# Patient Record
Sex: MALE | Race: WHITE | NOT HISPANIC OR LATINO | Employment: OTHER | URBAN - METROPOLITAN AREA
[De-identification: names, ages, dates, MRNs, and addresses within clinical notes are randomized per-mention and may not be internally consistent; named-entity substitution may affect disease eponyms.]

---

## 2019-08-09 ENCOUNTER — TRANSCRIBE ORDERS (OUTPATIENT)
Dept: CARDIOLOGY | Facility: HOSPITAL | Age: 54
End: 2019-08-09

## 2019-08-09 DIAGNOSIS — I25.10 CAD IN NATIVE ARTERY: Primary | ICD-10-CM

## 2019-08-09 DIAGNOSIS — R94.30 ABNORMAL CARDIOVASCULAR FUNCTION STUDY: ICD-10-CM

## 2019-08-12 ENCOUNTER — HOSPITAL ENCOUNTER (OUTPATIENT)
Dept: CARDIOLOGY | Facility: HOSPITAL | Age: 54
Discharge: HOME | End: 2019-08-12

## 2019-08-12 ENCOUNTER — HOSPITAL ENCOUNTER (OUTPATIENT)
Dept: CARDIOLOGY | Facility: HOSPITAL | Age: 54
Discharge: HOME | End: 2019-08-12
Admitting: NURSE PRACTITIONER

## 2019-08-12 DIAGNOSIS — I25.10 CAD IN NATIVE ARTERY: ICD-10-CM

## 2019-08-12 DIAGNOSIS — R94.30 ABNORMAL CARDIOVASCULAR FUNCTION STUDY: ICD-10-CM

## 2019-08-12 PROCEDURE — 93016 CV STRESS TEST SUPVJ ONLY: CPT | Performed by: NURSE PRACTITIONER

## 2019-08-12 PROCEDURE — A9500 TC99M SESTAMIBI: HCPCS | Performed by: NURSE PRACTITIONER

## 2019-08-12 PROCEDURE — 25010000002 REGADENOSON 0.4 MG/5ML SOLUTION: Performed by: NURSE PRACTITIONER

## 2019-08-12 PROCEDURE — 78452 HT MUSCLE IMAGE SPECT MULT: CPT

## 2019-08-12 PROCEDURE — 0 TECHNETIUM SESTAMIBI: Performed by: NURSE PRACTITIONER

## 2019-08-12 PROCEDURE — 93017 CV STRESS TEST TRACING ONLY: CPT

## 2019-08-12 RX ADMIN — REGADENOSON 0.4 MG: 0.08 INJECTION, SOLUTION INTRAVENOUS at 09:47

## 2019-08-12 RX ADMIN — TECHNETIUM TC 99M SESTAMIBI 1 DOSE: 1 INJECTION INTRAVENOUS at 09:47

## 2019-08-12 RX ADMIN — TECHNETIUM TC 99M SESTAMIBI 1 DOSE: 1 INJECTION INTRAVENOUS at 08:10

## 2019-08-13 LAB
BH CV STRESS COMMENTS STAGE 1: NORMAL
BH CV STRESS DOSE REGADENOSON STAGE 1: 0.4
BH CV STRESS DURATION MIN STAGE 1: 0
BH CV STRESS DURATION SEC STAGE 1: 10
BH CV STRESS PROTOCOL 1: NORMAL
BH CV STRESS RECOVERY BP: NORMAL MMHG
BH CV STRESS RECOVERY HR: 72 BPM
BH CV STRESS STAGE 1: 1
LV EF NUC BP: 57 %
MAXIMAL PREDICTED HEART RATE: 165 BPM
PERCENT MAX PREDICTED HR: 47.88 %
STRESS BASELINE BP: NORMAL MMHG
STRESS BASELINE HR: 61 BPM
STRESS PERCENT HR: 56 %
STRESS POST PEAK BP: NORMAL MMHG
STRESS POST PEAK HR: 79 BPM
STRESS TARGET HR: 140 BPM

## 2019-08-13 PROCEDURE — 78452 HT MUSCLE IMAGE SPECT MULT: CPT | Performed by: INTERNAL MEDICINE

## 2019-08-13 PROCEDURE — 93018 CV STRESS TEST I&R ONLY: CPT | Performed by: INTERNAL MEDICINE

## 2019-08-22 ENCOUNTER — OFFICE VISIT (OUTPATIENT)
Dept: CARDIOLOGY | Facility: CLINIC | Age: 54
End: 2019-08-22

## 2019-08-22 VITALS
HEART RATE: 75 BPM | OXYGEN SATURATION: 97 % | BODY MASS INDEX: 40.46 KG/M2 | WEIGHT: 282 LBS | DIASTOLIC BLOOD PRESSURE: 79 MMHG | SYSTOLIC BLOOD PRESSURE: 140 MMHG

## 2019-08-22 DIAGNOSIS — E78.2 MIXED HYPERLIPIDEMIA: ICD-10-CM

## 2019-08-22 DIAGNOSIS — Z01.810 PREOPERATIVE CARDIOVASCULAR EXAMINATION: ICD-10-CM

## 2019-08-22 DIAGNOSIS — I10 ESSENTIAL HYPERTENSION: ICD-10-CM

## 2019-08-22 DIAGNOSIS — I25.810 CORONARY ARTERY DISEASE INVOLVING CORONARY BYPASS GRAFT OF NATIVE HEART WITHOUT ANGINA PECTORIS: Primary | ICD-10-CM

## 2019-08-22 DIAGNOSIS — R00.2 PALPITATIONS: ICD-10-CM

## 2019-08-22 PROCEDURE — 99214 OFFICE O/P EST MOD 30 MIN: CPT | Performed by: INTERNAL MEDICINE

## 2019-08-22 PROCEDURE — 93000 ELECTROCARDIOGRAM COMPLETE: CPT | Performed by: INTERNAL MEDICINE

## 2019-08-22 RX ORDER — ISOSORBIDE MONONITRATE 60 MG/1
120 TABLET, EXTENDED RELEASE ORAL 2 TIMES DAILY
Refills: 3 | COMMUNITY
Start: 2019-07-15 | End: 2021-08-06 | Stop reason: DRUGHIGH

## 2019-08-22 RX ORDER — PANTOPRAZOLE SODIUM 40 MG/1
40 TABLET, DELAYED RELEASE ORAL DAILY
COMMUNITY

## 2019-08-22 RX ORDER — CLOPIDOGREL BISULFATE 75 MG/1
TABLET ORAL
COMMUNITY
Start: 2015-09-29 | End: 2019-10-30

## 2019-08-22 RX ORDER — ATORVASTATIN CALCIUM 80 MG/1
TABLET, FILM COATED ORAL
COMMUNITY
End: 2019-10-30

## 2019-08-22 RX ORDER — CARVEDILOL 25 MG/1
25 TABLET ORAL 2 TIMES DAILY WITH MEALS
Refills: 3 | COMMUNITY
Start: 2019-08-14

## 2019-08-22 RX ORDER — INSULIN GLARGINE 100 [IU]/ML
55 INJECTION, SOLUTION SUBCUTANEOUS NIGHTLY
COMMUNITY

## 2019-08-22 RX ORDER — ESCITALOPRAM OXALATE 10 MG/1
10 TABLET ORAL DAILY
Refills: 2 | COMMUNITY
Start: 2019-08-12

## 2019-08-22 NOTE — PROGRESS NOTES
Visit Type:  Consult- former pt.   Referring Provider:  Jeana Weber DNP   Primary Care Provider:  Jeana Weber DNP and [facility] in [facility]     Chief Complaint:  cardiac evaluation for gastric sleeve.    History of Present Illness:    CC:  Preoperative evaluation for pain stimulator.  Chronic ischemic heart disease status post previous CABG 15 years ago, history of recent fluttering.  Diabetes, dyslipidemia.    Dear Jeana    Mr. Tyson is a very pleasant 54 years old gentleman with history of premature coronary artery disease, status post CABG performed about 14 years ago, diabetes, hypertension, dyslipidemia, exogenous obesity and hiatal hernia.     He offers no complaints of chest pain.  He has stable dyspnea on exertion probably attributed to  obesity.  He is a known diabetic as well and takes metformin.  He is contemplating undergoing pain stimulator for his coccyx.    Patient denies any presyncope or syncope.  He has had 2 episodes of short-lived fluttering.  As far as I can tell, presence of atrial fibrillation has not been determined by event monitor although I do not have the event monitor report available to me at the time of this dictation.    He recently underwent Lexiscan Cardiolite study which showed ejection fraction 57% with no evidence of provokable ischemia.  There were fixed inferior apical and lateral defects.  Septal motion was abnormal consistent with previous coronary artery bypass surgery.    His EKG of 8/20/2019 showed normal sinus rhythm with a rate of 76 bpm WI interval of 174 ms QRS duration of 116 ms  ms and QRS axis was 20.  Right bundle branch block incomplete was noted.  Old inferior myocardial infarction cannot be excluded.  There was no difference as compared to previous EKG of 9/29/2015.    Assessment/plan:    1.  Chronic chronic ischemic heart disease status post previous CABG.  Stable no anginal symptoms    2.  History of fluttering nature of any  "cardiac dysrhythmia has not been determined yet but the symptoms are infrequent.  Loop recorder may be considered if he has sporadic symptoms of palpitations.    3.  Blood pressures under good control 140/79 on current therapy.    Patient is stable to proceed with pain stimulator procedure without significant cardiac risk.  We will be glad to see him as the need arise.    Thanks very much for allowing us to participate in the care of your patients       We will see him on a p.r.n. basis and will  send him back to you for further management.  Thanks very much for allowing us to participate in the care of your patients  and I wish him well in his surgery.            Past Medical History:     Reviewed history from 09/29/2015 and no changes required:        Diabetes, Type 2        G E R D        Hypertension        Partial Knee Arthroplasty-left        Hyperlipidemia        Kidney Stone        Myocardial Infarction-\"when in 20's\"        Sleep apnea- C-pap machine         Hiatal hernia        Back pain        morbid obesity    Past Surgical History:     Reviewed history from 09/29/2015 and no changes required:        heart bypass 11/2004-quadruple        penis inplant        pain stimulator 2009        knee replacement 11/2014-left        Cardiac Cath: last at [facility] 2012         Angioplasty/Stent: 3 or 4 stents    Coronary bypass surgery 2015    Active Medications (reviewed today):  LIPITOR 80 MG TABS (ATORVASTATIN CALCIUM) Take one by mouth daily  VITAMIN D3 5000 UNIT CAPS (CHOLECALCIFEROL) Take one by mouth daily  RAPATHA () q 2 weeks  PLAVIX 75 MG TABS (CLOPIDOGREL BISULFATE) Take one by mouth daily  RANEXA 500 MG ORAL XR12H-TAB (RANOLAZINE) take one tab po BID  GNP VITAMIN D SUPER STRENGTH 5000 UNIT ORAL TABS (CHOLECALCIFEROL) take one tab po daily  METFORMIN HCL 1000 MG TABS (METFORMIN HCL) take one tab po BID with breakfast & supper    CARVEDILOL 12.5 MG ORAL TABS (CARVEDILOL) take one tab po BID    PRILOSEC 20 MG " ORAL CPDR (OMEPRAZOLE) take  one tab po BID  LOSARTAN POTASSIUM TABS (LOSARTAN POTASSIUM TABS) 100mg po daily  ISOSORBIDE MONONITRATE ER 60 MG ORAL XR24H-TAB (ISOSORBIDE MONONITRATE) take one tab po daily  ASPIRIN LOW DOSE 81 MG TABS (ASPIRIN) take one tab po daily  LANTUS SOLN (INSULIN GLARGINE SOLN) inject 100 units subq BID  NOVOLOG SOLN (INSULIN ASPART SOLN) sliding scale before evening  meal usually between 40-60 units    Current Allergies (reviewed this update):  * VICONDEN (Critical)  * LORATAB (Critical)    Family History Summary:      Reviewed history and no changes required: 2015  Mother (biol.) - Has Family History of Heart Disease - Mother had CABG -  of aneurysm in brain  - Entered On: 2015  Father (biol.) - Has Family History of Thyroid Problems - Father  from lung cancer and had thyroid cancer  - Entered On: 2015  Father (biol.) - Has Family History of Lung Cancer - Father  from lung cancer and had thyroid cancer  - Entered On: 2015    General Comments - FH:  FH cancer    Social History:     Reviewed history from 2015 and no changes required:        Marital Status:         Children: 2        Occupation: supervisor        Patient is a former smoker.                 Smoking History:        Patient is a former smoker.      Risk Factors:     Smoked Tobacco Use:  Former smoker     Cigarettes: Patient quit smoking about 5 years ago   Drug use:  none  HIV high-risk behavior:  no  Caffeine use:  3 drinks per day  Alcohol use:  yes     Type:  seldom - mixed drink   Exercise:  no  Seatbelt use:  100 %    Family History Risk Factors:     Family History of MI in females < 65 years old:  yes     Family History of MI in males < 55 years old:  no    Colonoscopy History:      Date of Last Colonoscopy:  2013     Results:  Normal         Review of Systems   General: denies fevers, chills, sweats, anorexia, fatigue, malaise, weight loss  Cardiovascular:  coronary  artery disease ,  status post CABG 11 years ago.  Hypertension.  Dyslipidemia.  Diabetes.  Respiratory:   Obstructive sleep apnea  Gastrointestinal:  gastroesophageal reflux disease hiatal hernia  Musculoskeletal:  degenerative joint disease.  Neurologic: denies transient paralysis, weakness, paresthesias, seizures, syncope, tremors, vertigo  Psychiatric: denies depression, anxiety, memory loss, mental disturbance, suicidal ideation, hallucinations, paranoia      Physical Exam    General:      well developed, well nourished, in no acute distress.    Neck:      no masses, thyromegaly, or abnormal cervical nodes.    No JVD. No carotid bruits  Lungs:      clear bilaterally to auscultation.    Heart:      non-displaced PMI, chest non-tender; regular rate and rhythm, S1, S2 without murmurs, rubs, or gallops  Abdomen:       normal bowel sounds; no hepatosplenomegaly no ventral,umbilical hernias or masses noted.    Pulses:      pulses normal in all 4 extremities.    Extremities:       no edema.

## 2019-10-07 ENCOUNTER — OFFICE VISIT (OUTPATIENT)
Dept: NEUROSURGERY | Facility: CLINIC | Age: 54
End: 2019-10-07

## 2019-10-07 VITALS
DIASTOLIC BLOOD PRESSURE: 77 MMHG | OXYGEN SATURATION: 92 % | RESPIRATION RATE: 18 BRPM | HEIGHT: 70 IN | WEIGHT: 282 LBS | BODY MASS INDEX: 40.37 KG/M2 | HEART RATE: 86 BPM | SYSTOLIC BLOOD PRESSURE: 147 MMHG

## 2019-10-07 DIAGNOSIS — M53.3 COCCYDYNIA: ICD-10-CM

## 2019-10-07 DIAGNOSIS — T85.192A FAILURE OF SPINAL CORD STIMULATOR, INITIAL ENCOUNTER (HCC): ICD-10-CM

## 2019-10-07 DIAGNOSIS — M54.50 ACUTE LOW BACK PAIN, UNSPECIFIED BACK PAIN LATERALITY, UNSPECIFIED WHETHER SCIATICA PRESENT: Primary | ICD-10-CM

## 2019-10-07 PROCEDURE — 99203 OFFICE O/P NEW LOW 30 MIN: CPT | Performed by: NEUROLOGICAL SURGERY

## 2019-10-07 NOTE — PROGRESS NOTES
"Subjective   Uday Jay is a 54 y.o. male.     Chief Complaint   Patient presents with   • Back Pain     problems with stimuator      Visit Vitals  /77 (BP Location: Right arm, Patient Position: Sitting, Cuff Size: Large Adult)   Pulse 86   Resp 18   Ht 177.8 cm (70\")   Wt 128 kg (282 lb)   SpO2 92%   BMI 40.46 kg/m²       History of Present Illness: Mr. 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  He fell from a ladder of approximately 8 feet onto his tailbone.  He states since that time he had pain just focused right in the coccygeal area.  He was diagnosed with coxa diarrhea.  He did undergone a series of injections which she states used to help.  Over time he moved to [facility] where he was seen by Dr. Garcia.  The injections stop working overtime.  He was offered neuromodulation at that time.  He had a spinal cord stimulator implanted which was a [employer] system.  He got more than 50% relief of the pain until one day he started noticing that he was not getting the paresthesias anymore.  Subsequently the stimulator was deemed not to be working.  There is no imaging after this.  He was referred to Dr. Tadeo who evaluated him and performed imaging of the lumbar spine demonstrated that the leads had migrated out of the epidural space.  The patient states that he where she did have the stimulator reimplanted because it was providing relief.  He denies any new leg pain or states that he is having any significant back pain he does have chronic back pain for which she does states that helps some but the biggest part of his pain is the pain in his tailbone.  It still predominately aggravated prolonged sitting and standing.  He states the pain is constant but is aggravated with pressure.  He denies any pain on defecation.    The following portions of the patient's history were reviewed and updated as appropriate: allergies, current " medications, past family history, past medical history, past social history, past surgical history and problem list.    Review of Systems      Past Surgical History:   Procedure Laterality Date   • CARDIAC CATHETERIZATION     • CAROTID STENT     • CORONARY ARTERY BYPASS GRAFT     • KNEE ARTHROPLASTY, PARTIAL REPLACEMENT     • PENILE PROSTHESIS IMPLANT     • SPINAL CORD STIMULATOR IMPLANT         Past Medical History:   Diagnosis Date   • Diabetes (CMS/[facility])    • Hyperlipidemia    • Hypertension    • Low back pain        Objective   Physical Exam  Neurologic Exam  Ortho Exam    Alert and oriented by 3  Speech is intact coherent articulate  Cranial nerves III through XII are grossly intact  Motor exam is 5/5 both upper extremities  Lumbar spine mistreats full range of motion  Tender to palpation in the coccygeal region  Data straight leg lift  2+ distal pulses      Upper flexion extension films demonstrate relatively intact disc height and lumbar motion.  There is no evidence of listhesis.  There is migration of the spinal cord stimulator distal leads out of the epidural space in the subcutaneous and muscular region of the lumbar spine.    Assessment and Plan: At this time Mr. Jay suffers from chronic coccydynia.  He did respond to implantation of a spinal cord stimulator system.  Unfortunately this is migrated out of the epidural space.  We discussed today to proceed back with placement of an epidural paddle.  We did discuss the risk of the procedure being bleeding, death, paralysis, infection, CSF leak, failure the procedure need for the procedures.  He indicates he understands wishes to proceed.      Problems Addressed this Visit     None

## 2019-10-14 PROBLEM — M53.3 COCCYDYNIA: Status: ACTIVE | Noted: 2019-10-14

## 2019-10-22 ENCOUNTER — PREP FOR SURGERY (OUTPATIENT)
Dept: OTHER | Facility: HOSPITAL | Age: 54
End: 2019-10-22

## 2019-10-22 DIAGNOSIS — M53.3 COCCYDYNIA: Primary | ICD-10-CM

## 2019-10-22 DIAGNOSIS — T85.192A: ICD-10-CM

## 2019-10-22 RX ORDER — SODIUM CHLORIDE 0.9 % (FLUSH) 0.9 %
3-10 SYRINGE (ML) INJECTION AS NEEDED
Status: CANCELLED | OUTPATIENT
Start: 2019-10-22

## 2019-10-22 RX ORDER — SODIUM CHLORIDE 0.9 % (FLUSH) 0.9 %
3 SYRINGE (ML) INJECTION EVERY 12 HOURS SCHEDULED
Status: CANCELLED | OUTPATIENT
Start: 2019-10-22

## 2019-10-22 RX ORDER — CEFAZOLIN SODIUM IN 0.9 % NACL 3 G/100 ML
3 INTRAVENOUS SOLUTION, PIGGYBACK (ML) INTRAVENOUS ONCE
Status: CANCELLED | OUTPATIENT
Start: 2019-10-22 | End: 2019-10-22

## 2019-10-25 ENCOUNTER — APPOINTMENT (OUTPATIENT)
Dept: PREADMISSION TESTING | Facility: HOSPITAL | Age: 54
End: 2019-10-25

## 2019-10-30 ENCOUNTER — APPOINTMENT (OUTPATIENT)
Dept: PREADMISSION TESTING | Facility: HOSPITAL | Age: 54
End: 2019-10-30

## 2019-10-30 ENCOUNTER — TELEPHONE (OUTPATIENT)
Dept: PREADMISSION TESTING | Facility: HOSPITAL | Age: 54
End: 2019-10-30

## 2019-10-30 ENCOUNTER — HOSPITAL ENCOUNTER (OUTPATIENT)
Dept: GENERAL RADIOLOGY | Facility: HOSPITAL | Age: 54
Discharge: HOME | End: 2019-10-30
Admitting: PHYSICIAN ASSISTANT

## 2019-10-30 VITALS
DIASTOLIC BLOOD PRESSURE: 82 MMHG | HEART RATE: 65 BPM | WEIGHT: 283.6 LBS | OXYGEN SATURATION: 96 % | BODY MASS INDEX: 40.6 KG/M2 | HEIGHT: 70 IN | SYSTOLIC BLOOD PRESSURE: 142 MMHG

## 2019-10-30 DIAGNOSIS — T85.192A: ICD-10-CM

## 2019-10-30 DIAGNOSIS — M53.3 COCCYDYNIA: ICD-10-CM

## 2019-10-30 LAB
ABO GROUP BLD: NORMAL
ALBUMIN SERPL-MCNC: 4.7 G/DL (ref 3.5–5.2)
ALBUMIN/GLOB SERPL: 1.4 G/DL
ALP SERPL-CCNC: 87 U/L (ref 39–117)
ALT SERPL W P-5'-P-CCNC: 28 U/L (ref 1–41)
ANION GAP SERPL CALCULATED.3IONS-SCNC: 13 MMOL/L (ref 5–15)
APTT PPP: 22.7 SECONDS (ref 24–31)
AST SERPL-CCNC: 32 U/L (ref 1–40)
BASOPHILS # BLD AUTO: 0 10*3/MM3 (ref 0–0.2)
BASOPHILS NFR BLD AUTO: 1.3 % (ref 0–1.5)
BILIRUB SERPL-MCNC: 1.2 MG/DL (ref 0.2–1.2)
BILIRUB UR QL STRIP: NEGATIVE
BLD GP AB SCN SERPL QL: NEGATIVE
BUN BLD-MCNC: 15 MG/DL (ref 6–20)
BUN/CREAT SERPL: 17 (ref 7–25)
CALCIUM SPEC-SCNC: 10 MG/DL (ref 8.6–10.5)
CHLORIDE SERPL-SCNC: 98 MMOL/L (ref 98–107)
CLARITY UR: CLEAR
CO2 SERPL-SCNC: 30 MMOL/L (ref 22–29)
COLOR UR: YELLOW
CREAT BLD-MCNC: 0.88 MG/DL (ref 0.76–1.27)
DEPRECATED RDW RBC AUTO: 45.9 FL (ref 37–54)
EOSINOPHIL # BLD AUTO: 0.1 10*3/MM3 (ref 0–0.4)
EOSINOPHIL NFR BLD AUTO: 2.4 % (ref 0.3–6.2)
ERYTHROCYTE [DISTWIDTH] IN BLOOD BY AUTOMATED COUNT: 14.3 % (ref 12.3–15.4)
GFR SERPL CREATININE-BSD FRML MDRD: 90 ML/MIN/1.73
GLOBULIN UR ELPH-MCNC: 3.4 GM/DL
GLUCOSE BLD-MCNC: 123 MG/DL (ref 65–99)
GLUCOSE UR STRIP-MCNC: ABNORMAL MG/DL
HCT VFR BLD AUTO: 41.6 % (ref 37.5–51)
HGB BLD-MCNC: 13.9 G/DL (ref 13–17.7)
HGB UR QL STRIP.AUTO: NEGATIVE
INR PPP: 0.94 (ref 0.9–1.1)
KETONES UR QL STRIP: NEGATIVE
LEUKOCYTE ESTERASE UR QL STRIP.AUTO: NEGATIVE
LYMPHOCYTES # BLD AUTO: 1.1 10*3/MM3 (ref 0.7–3.1)
LYMPHOCYTES NFR BLD AUTO: 28 % (ref 19.6–45.3)
MCH RBC QN AUTO: 30.4 PG (ref 26.6–33)
MCHC RBC AUTO-ENTMCNC: 33.4 G/DL (ref 31.5–35.7)
MCV RBC AUTO: 90.8 FL (ref 79–97)
MONOCYTES # BLD AUTO: 0.4 10*3/MM3 (ref 0.1–0.9)
MONOCYTES NFR BLD AUTO: 9.4 % (ref 5–12)
NEUTROPHILS # BLD AUTO: 2.3 10*3/MM3 (ref 1.7–7)
NEUTROPHILS NFR BLD AUTO: 58.9 % (ref 42.7–76)
NITRITE UR QL STRIP: NEGATIVE
NRBC BLD AUTO-RTO: 0.2 /100 WBC (ref 0–0.2)
PH UR STRIP.AUTO: 7.5 [PH] (ref 5–8)
PLATELET # BLD AUTO: 183 10*3/MM3 (ref 140–450)
PMV BLD AUTO: 8.7 FL (ref 6–12)
POTASSIUM BLD-SCNC: 4.3 MMOL/L (ref 3.5–5.2)
PROT SERPL-MCNC: 8.1 G/DL (ref 6–8.5)
PROT UR QL STRIP: NEGATIVE
PROTHROMBIN TIME: 10 SECONDS (ref 9.6–11.7)
RBC # BLD AUTO: 4.58 10*6/MM3 (ref 4.14–5.8)
RH BLD: POSITIVE
SODIUM BLD-SCNC: 141 MMOL/L (ref 136–145)
SP GR UR STRIP: 1.05 (ref 1–1.03)
T&S EXPIRATION DATE: NORMAL
UROBILINOGEN UR QL STRIP: ABNORMAL
WBC NRBC COR # BLD: 3.9 10*3/MM3 (ref 3.4–10.8)

## 2019-10-30 PROCEDURE — 81003 URINALYSIS AUTO W/O SCOPE: CPT | Performed by: PHYSICIAN ASSISTANT

## 2019-10-30 PROCEDURE — 85025 COMPLETE CBC W/AUTO DIFF WBC: CPT | Performed by: PHYSICIAN ASSISTANT

## 2019-10-30 PROCEDURE — 93005 ELECTROCARDIOGRAM TRACING: CPT

## 2019-10-30 PROCEDURE — 86850 RBC ANTIBODY SCREEN: CPT | Performed by: PHYSICIAN ASSISTANT

## 2019-10-30 PROCEDURE — 80053 COMPREHEN METABOLIC PANEL: CPT | Performed by: PHYSICIAN ASSISTANT

## 2019-10-30 PROCEDURE — 85610 PROTHROMBIN TIME: CPT | Performed by: PHYSICIAN ASSISTANT

## 2019-10-30 PROCEDURE — 86900 BLOOD TYPING SEROLOGIC ABO: CPT

## 2019-10-30 PROCEDURE — 86901 BLOOD TYPING SEROLOGIC RH(D): CPT | Performed by: PHYSICIAN ASSISTANT

## 2019-10-30 PROCEDURE — 85730 THROMBOPLASTIN TIME PARTIAL: CPT | Performed by: PHYSICIAN ASSISTANT

## 2019-10-30 PROCEDURE — 86901 BLOOD TYPING SEROLOGIC RH(D): CPT

## 2019-10-30 PROCEDURE — 71046 X-RAY EXAM CHEST 2 VIEWS: CPT

## 2019-10-30 PROCEDURE — 87081 CULTURE SCREEN ONLY: CPT | Performed by: PHYSICIAN ASSISTANT

## 2019-10-30 PROCEDURE — 36415 COLL VENOUS BLD VENIPUNCTURE: CPT

## 2019-10-30 PROCEDURE — 86900 BLOOD TYPING SEROLOGIC ABO: CPT | Performed by: PHYSICIAN ASSISTANT

## 2019-10-30 RX ORDER — ATORVASTATIN CALCIUM 80 MG/1
80 TABLET, FILM COATED ORAL DAILY
COMMUNITY

## 2019-10-30 RX ORDER — ASPIRIN 325 MG
325 TABLET, DELAYED RELEASE (ENTERIC COATED) ORAL DAILY
COMMUNITY

## 2019-10-30 RX ORDER — CLOPIDOGREL BISULFATE 75 MG/1
75 TABLET ORAL DAILY
COMMUNITY

## 2019-10-31 LAB — MRSA SPEC QL CULT: NORMAL

## 2019-10-31 PROCEDURE — 93010 ELECTROCARDIOGRAM REPORT: CPT | Performed by: INTERNAL MEDICINE

## 2019-11-04 NOTE — PAT
Spoke with Anat at Dr Tyler, she said that Dr Tyler should clear him however she cant get the clearance to us today, she will scan it into epic in the morning. Clearance was first requested on 10/31/19.

## 2019-11-05 ENCOUNTER — HOSPITAL ENCOUNTER (OUTPATIENT)
Facility: HOSPITAL | Age: 54
Setting detail: HOSPITAL OUTPATIENT SURGERY
Discharge: HOME | End: 2019-11-05
Attending: NEUROLOGICAL SURGERY | Admitting: NEUROLOGICAL SURGERY

## 2019-11-05 ENCOUNTER — ANESTHESIA EVENT (OUTPATIENT)
Dept: PERIOP | Facility: HOSPITAL | Age: 54
End: 2019-11-05

## 2019-11-05 ENCOUNTER — APPOINTMENT (OUTPATIENT)
Dept: GENERAL RADIOLOGY | Facility: HOSPITAL | Age: 54
End: 2019-11-05

## 2019-11-05 ENCOUNTER — ANESTHESIA (OUTPATIENT)
Dept: PERIOP | Facility: HOSPITAL | Age: 54
End: 2019-11-05

## 2019-11-05 VITALS
HEIGHT: 71 IN | SYSTOLIC BLOOD PRESSURE: 130 MMHG | BODY MASS INDEX: 39.23 KG/M2 | TEMPERATURE: 97.5 F | RESPIRATION RATE: 18 BRPM | WEIGHT: 280.2 LBS | HEART RATE: 66 BPM | DIASTOLIC BLOOD PRESSURE: 75 MMHG | OXYGEN SATURATION: 95 %

## 2019-11-05 DIAGNOSIS — T85.192A: ICD-10-CM

## 2019-11-05 DIAGNOSIS — M53.3 COCCYDYNIA: ICD-10-CM

## 2019-11-05 LAB
GLUCOSE BLDC GLUCOMTR-MCNC: 119 MG/DL (ref 70–105)
GLUCOSE BLDC GLUCOMTR-MCNC: 125 MG/DL (ref 70–105)

## 2019-11-05 PROCEDURE — 25010000002 DEXAMETHASONE PER 1 MG: Performed by: NURSE ANESTHETIST, CERTIFIED REGISTERED

## 2019-11-05 PROCEDURE — 25010000002 KETOROLAC TROMETHAMINE PER 15 MG: Performed by: ANESTHESIOLOGY

## 2019-11-05 PROCEDURE — 76000 FLUOROSCOPY <1 HR PHYS/QHP: CPT

## 2019-11-05 PROCEDURE — 25010000002 FENTANYL CITRATE (PF) 100 MCG/2ML SOLUTION: Performed by: NURSE ANESTHETIST, CERTIFIED REGISTERED

## 2019-11-05 PROCEDURE — 63661 REMOVE SPINE ELTRD PERQ ARAY: CPT | Performed by: NEUROLOGICAL SURGERY

## 2019-11-05 PROCEDURE — 63688 REV/RMV IMP SP NPG/R DTCH CN: CPT | Performed by: NEUROLOGICAL SURGERY

## 2019-11-05 PROCEDURE — C1778 LEAD, NEUROSTIMULATOR: HCPCS | Performed by: NEUROLOGICAL SURGERY

## 2019-11-05 PROCEDURE — 25010000002 PROPOFOL 10 MG/ML EMULSION: Performed by: NURSE ANESTHETIST, CERTIFIED REGISTERED

## 2019-11-05 PROCEDURE — S0260 H&P FOR SURGERY: HCPCS | Performed by: NEUROLOGICAL SURGERY

## 2019-11-05 PROCEDURE — 82962 GLUCOSE BLOOD TEST: CPT

## 2019-11-05 PROCEDURE — 72070 X-RAY EXAM THORAC SPINE 2VWS: CPT

## 2019-11-05 PROCEDURE — 63655 IMPLANT NEUROELECTRODES: CPT | Performed by: NEUROLOGICAL SURGERY

## 2019-11-05 DEVICE — LD STIM PENTA PADL KT 16CH 3MM 60CM: Type: IMPLANTABLE DEVICE | Site: BACK | Status: FUNCTIONAL

## 2019-11-05 DEVICE — ANCHR LD SCS SWIFTLOCK 1192: Type: IMPLANTABLE DEVICE | Site: BACK | Status: FUNCTIONAL

## 2019-11-05 RX ORDER — FENTANYL CITRATE 50 UG/ML
50 INJECTION, SOLUTION INTRAMUSCULAR; INTRAVENOUS
Status: DISCONTINUED | OUTPATIENT
Start: 2019-11-05 | End: 2019-11-05 | Stop reason: HOSPADM

## 2019-11-05 RX ORDER — DEXAMETHASONE SODIUM PHOSPHATE 4 MG/ML
INJECTION, SOLUTION INTRA-ARTICULAR; INTRALESIONAL; INTRAMUSCULAR; INTRAVENOUS; SOFT TISSUE AS NEEDED
Status: DISCONTINUED | OUTPATIENT
Start: 2019-11-05 | End: 2019-11-05 | Stop reason: SURG

## 2019-11-05 RX ORDER — TRAMADOL HYDROCHLORIDE 50 MG/1
50 TABLET ORAL EVERY 6 HOURS PRN
Qty: 90 TABLET | Refills: 0 | Status: SHIPPED | OUTPATIENT
Start: 2019-11-05 | End: 2020-10-19

## 2019-11-05 RX ORDER — PROPOFOL 10 MG/ML
VIAL (ML) INTRAVENOUS AS NEEDED
Status: DISCONTINUED | OUTPATIENT
Start: 2019-11-05 | End: 2019-11-05 | Stop reason: SURG

## 2019-11-05 RX ORDER — LIDOCAINE HYDROCHLORIDE 20 MG/ML
INJECTION, SOLUTION EPIDURAL; INFILTRATION; INTRACAUDAL; PERINEURAL AS NEEDED
Status: DISCONTINUED | OUTPATIENT
Start: 2019-11-05 | End: 2019-11-05 | Stop reason: SURG

## 2019-11-05 RX ORDER — HYDROMORPHONE HCL 110MG/55ML
0.5 PATIENT CONTROLLED ANALGESIA SYRINGE INTRAVENOUS
Status: DISCONTINUED | OUTPATIENT
Start: 2019-11-05 | End: 2019-11-05 | Stop reason: HOSPADM

## 2019-11-05 RX ORDER — SODIUM CHLORIDE 0.9 % (FLUSH) 0.9 %
3 SYRINGE (ML) INJECTION EVERY 12 HOURS SCHEDULED
Status: DISCONTINUED | OUTPATIENT
Start: 2019-11-05 | End: 2019-11-05 | Stop reason: HOSPADM

## 2019-11-05 RX ORDER — FENTANYL CITRATE 50 UG/ML
INJECTION, SOLUTION INTRAMUSCULAR; INTRAVENOUS AS NEEDED
Status: DISCONTINUED | OUTPATIENT
Start: 2019-11-05 | End: 2019-11-05 | Stop reason: SURG

## 2019-11-05 RX ORDER — CEFAZOLIN SODIUM IN 0.9 % NACL 3 G/100 ML
3 INTRAVENOUS SOLUTION, PIGGYBACK (ML) INTRAVENOUS ONCE
Status: DISCONTINUED | OUTPATIENT
Start: 2019-11-05 | End: 2019-11-05 | Stop reason: HOSPADM

## 2019-11-05 RX ORDER — ONDANSETRON 2 MG/ML
4 INJECTION INTRAMUSCULAR; INTRAVENOUS ONCE AS NEEDED
Status: DISCONTINUED | OUTPATIENT
Start: 2019-11-05 | End: 2019-11-05 | Stop reason: HOSPADM

## 2019-11-05 RX ORDER — BACITRACIN 50000 [IU]/1
INJECTION, POWDER, FOR SOLUTION INTRAMUSCULAR AS NEEDED
Status: DISCONTINUED | OUTPATIENT
Start: 2019-11-05 | End: 2019-11-05 | Stop reason: HOSPADM

## 2019-11-05 RX ORDER — HYDROCODONE BITARTRATE AND ACETAMINOPHEN 7.5; 325 MG/1; MG/1
1 TABLET ORAL ONCE AS NEEDED
Status: COMPLETED | OUTPATIENT
Start: 2019-11-05 | End: 2019-11-05

## 2019-11-05 RX ORDER — ROCURONIUM BROMIDE 10 MG/ML
INJECTION, SOLUTION INTRAVENOUS AS NEEDED
Status: DISCONTINUED | OUTPATIENT
Start: 2019-11-05 | End: 2019-11-05 | Stop reason: SURG

## 2019-11-05 RX ORDER — SODIUM CHLORIDE 0.9 % (FLUSH) 0.9 %
3-10 SYRINGE (ML) INJECTION AS NEEDED
Status: DISCONTINUED | OUTPATIENT
Start: 2019-11-05 | End: 2019-11-05 | Stop reason: HOSPADM

## 2019-11-05 RX ORDER — LABETALOL HYDROCHLORIDE 5 MG/ML
5 INJECTION, SOLUTION INTRAVENOUS
Status: DISCONTINUED | OUTPATIENT
Start: 2019-11-05 | End: 2019-11-05 | Stop reason: HOSPADM

## 2019-11-05 RX ORDER — LIDOCAINE HYDROCHLORIDE AND EPINEPHRINE 10; 10 MG/ML; UG/ML
INJECTION, SOLUTION INFILTRATION; PERINEURAL AS NEEDED
Status: DISCONTINUED | OUTPATIENT
Start: 2019-11-05 | End: 2019-11-05 | Stop reason: HOSPADM

## 2019-11-05 RX ORDER — NEOSTIGMINE METHYLSULFATE 5 MG/5 ML
SYRINGE (ML) INTRAVENOUS AS NEEDED
Status: DISCONTINUED | OUTPATIENT
Start: 2019-11-05 | End: 2019-11-05 | Stop reason: SURG

## 2019-11-05 RX ORDER — HYDRALAZINE HYDROCHLORIDE 20 MG/ML
5 INJECTION INTRAMUSCULAR; INTRAVENOUS
Status: DISCONTINUED | OUTPATIENT
Start: 2019-11-05 | End: 2019-11-05 | Stop reason: HOSPADM

## 2019-11-05 RX ORDER — KETOROLAC TROMETHAMINE 30 MG/ML
INJECTION, SOLUTION INTRAMUSCULAR; INTRAVENOUS AS NEEDED
Status: DISCONTINUED | OUTPATIENT
Start: 2019-11-05 | End: 2019-11-05 | Stop reason: SURG

## 2019-11-05 RX ORDER — SODIUM CHLORIDE 9 MG/ML
9 INJECTION, SOLUTION INTRAVENOUS CONTINUOUS PRN
Status: DISCONTINUED | OUTPATIENT
Start: 2019-11-05 | End: 2019-11-05 | Stop reason: HOSPADM

## 2019-11-05 RX ORDER — GLYCOPYRROLATE 1 MG/5 ML
SYRINGE (ML) INTRAVENOUS AS NEEDED
Status: DISCONTINUED | OUTPATIENT
Start: 2019-11-05 | End: 2019-11-05 | Stop reason: SURG

## 2019-11-05 RX ADMIN — PROPOFOL 200 MG: 10 INJECTION, EMULSION INTRAVENOUS at 14:46

## 2019-11-05 RX ADMIN — Medication 0.2 MG: at 15:17

## 2019-11-05 RX ADMIN — FENTANYL CITRATE 50 MCG: 50 INJECTION, SOLUTION INTRAMUSCULAR; INTRAVENOUS at 15:29

## 2019-11-05 RX ADMIN — SODIUM CHLORIDE 9 ML/HR: 0.9 INJECTION, SOLUTION INTRAVENOUS at 12:15

## 2019-11-05 RX ADMIN — SODIUM CHLORIDE: 0.9 INJECTION, SOLUTION INTRAVENOUS at 14:35

## 2019-11-05 RX ADMIN — CEFAZOLIN SODIUM 3 G: 1 INJECTION, POWDER, FOR SOLUTION INTRAMUSCULAR; INTRAVENOUS at 14:51

## 2019-11-05 RX ADMIN — DEXAMETHASONE SODIUM PHOSPHATE 4 MG: 4 INJECTION, SOLUTION INTRAMUSCULAR; INTRAVENOUS at 14:51

## 2019-11-05 RX ADMIN — LIDOCAINE HYDROCHLORIDE 100 MG: 20 INJECTION, SOLUTION EPIDURAL; INFILTRATION; INTRACAUDAL; PERINEURAL at 14:36

## 2019-11-05 RX ADMIN — KETOROLAC TROMETHAMINE 30 MG: 30 INJECTION, SOLUTION INTRAMUSCULAR at 16:08

## 2019-11-05 RX ADMIN — Medication 0.6 MG: at 16:05

## 2019-11-05 RX ADMIN — ROCURONIUM BROMIDE 30 MG: 10 INJECTION, SOLUTION INTRAVENOUS at 14:47

## 2019-11-05 RX ADMIN — FENTANYL CITRATE 50 MCG: 50 INJECTION, SOLUTION INTRAMUSCULAR; INTRAVENOUS at 15:13

## 2019-11-05 RX ADMIN — Medication 4 MG: at 16:05

## 2019-11-05 RX ADMIN — HYDROCODONE BITARTRATE AND ACETAMINOPHEN 1 TABLET: 7.5; 325 TABLET ORAL at 17:32

## 2019-11-05 RX ADMIN — FENTANYL CITRATE 100 MCG: 50 INJECTION, SOLUTION INTRAMUSCULAR; INTRAVENOUS at 14:36

## 2019-11-05 NOTE — ANESTHESIA POSTPROCEDURE EVALUATION
Patient: Uday Jay    Procedure Summary     Date:  11/05/19 Room / Location:  [facility] OR  / [facility] MAIN OR    Anesthesia Start:  1434 Anesthesia Stop:  1633    Procedure:  T10 laminectomy and placement of spinal cord stimulator (N/A Back) Diagnosis:       Coccydynia      Failed spinal cord stimulator (CMS/HCC)      (Coccydynia [M53.3])      (Failed spinal cord stimulator (CMS/HCC) [T85.192A])    Surgeon:  Gianluca Miles MD Provider:  Mikey Bird MD    Anesthesia Type:  general ASA Status:  3          Anesthesia Type: general  Last vitals  BP   133/85 (11/05/19 1744)   Temp   97.9 °F (36.6 °C) (11/05/19 1744)   Pulse   60 (11/05/19 1744)   Resp   18 (11/05/19 1744)     SpO2   92 % (11/05/19 1744)     Post Anesthesia Care and Evaluation    Patient location during evaluation: PACU  Patient participation: complete - patient participated  Level of consciousness: awake  Pain scale: See nurse's notes for pain score.  Pain management: adequate  Airway patency: patent  Anesthetic complications: No anesthetic complications  PONV Status: none  Cardiovascular status: acceptable  Respiratory status: acceptable  Hydration status: acceptable    Comments: Patient seen and examined postoperatively; vital signs stable; SpO2 greater than or equal to 90%; cardiopulmonary status stable; nausea/vomiting adequately controlled; pain adequately controlled; no apparent anesthesia complications; patient discharged from anesthesia care when discharge criteria were met

## 2019-11-05 NOTE — ANESTHESIA PREPROCEDURE EVALUATION
Anesthesia Evaluation     Patient summary reviewed   NPO Solid Status: > 8 hours  NPO Liquid Status: > 8 hours           Airway   Mallampati: I  TM distance: >3 FB  Neck ROM: full  No difficulty expected  Dental - normal exam     Pulmonary - normal exam   (+) sleep apnea,   Cardiovascular - normal exam  Exercise tolerance: good (4-7 METS)    ECG reviewed    (+) hypertension, CAD, CABG, hyperlipidemia,       Neuro/Psych  GI/Hepatic/Renal/Endo    (+)  GERD,  diabetes mellitus,     Musculoskeletal     Abdominal  - normal exam    Bowel sounds: normal.   Substance History      OB/GYN          Other                        Anesthesia Plan    ASA 3     general     intravenous induction     Anesthetic plan, all risks, benefits, and alternatives have been provided, discussed and informed consent has been obtained with: patient.  Use of blood products discussed with patient  Consented to blood products.

## 2019-11-05 NOTE — ANESTHESIA PROCEDURE NOTES
Airway  Urgency: elective    Date/Time: 11/5/2019 2:47 PM  Airway not difficult    General Information and Staff    Patient location during procedure: OR    Indications and Patient Condition  Indications for airway management: airway protection    Preoxygenated: yes  MILS maintained throughout  Mask difficulty assessment: 1 - vent by mask    Final Airway Details  Final airway type: endotracheal airway      Successful airway: ETT  Cuffed: yes   Successful intubation technique: direct laryngoscopy  Facilitating devices/methods: intubating stylet  Endotracheal tube insertion site: oral  Blade: Medrano  Blade size: 2  ETT size (mm): 7.5  Cormack-Lehane Classification: grade IIb - view of arytenoids or posterior of glottis only  Placement verified by: chest auscultation and capnometry   Cuff volume (mL): 10  Measured from: teeth  ETT/EBT  to teeth (cm): 23  Number of attempts at approach: 1  Assessment: lips, teeth, and gum same as pre-op and atraumatic intubation

## 2019-11-07 NOTE — H&P
" Subjective      Uday Jay is a 54 y.o. male.           Chief Complaint   Patient presents with   • Back Pain       problems with stimuator       Visit Vitals  /77 (BP Location: Right arm, Patient Position: Sitting, Cuff Size: Large Adult)   Pulse 86   Resp 18   Ht 177.8 cm (70\")   Wt 128 kg (282 lb)   SpO2 92%   BMI 40.46 kg/m²         History of Present Illness: Mr. 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  He fell from a ladder of approximately 8 feet onto his tailbone.  He states since that time he had pain just focused right in the coccygeal area.  He was diagnosed with coxa diarrhea.  He did undergone a series of injections which she states used to help.  Over time he moved to [facility] where he was seen by Dr. Garcia.  The injections stop working overtime.  He was offered neuromodulation at that time.  He had a spinal cord stimulator implanted which was a [employer] system.  He got more than 50% relief of the pain until one day he started noticing that he was not getting the paresthesias anymore.  Subsequently the stimulator was deemed not to be working.  There is no imaging after this.  He was referred to Dr. Tadeo who evaluated him and performed imaging of the lumbar spine demonstrated that the leads had migrated out of the epidural space.  The patient states that he where she did have the stimulator reimplanted because it was providing relief.  He denies any new leg pain or states that he is having any significant back pain he does have chronic back pain for which she does states that helps some but the biggest part of his pain is the pain in his tailbone.  It still predominately aggravated prolonged sitting and standing.  He states the pain is constant but is aggravated with pressure.  He denies any pain on defecation.     The following portions of the patient's history were reviewed and updated as appropriate: " allergies, current medications, past family history, past medical history, past social history, past surgical history and problem list.     Review of Systems        Surgical History         Past Surgical History:   Procedure Laterality Date   • CARDIAC CATHETERIZATION       • CAROTID STENT       • CORONARY ARTERY BYPASS GRAFT       • KNEE ARTHROPLASTY, PARTIAL REPLACEMENT       • PENILE PROSTHESIS IMPLANT       • SPINAL CORD STIMULATOR IMPLANT                Medical History        Past Medical History:   Diagnosis Date   • Diabetes (CMS/[facility])     • Hyperlipidemia     • Hypertension     • Low back pain                 Objective      Physical Exam  Neurologic Exam  Ortho Exam     Alert and oriented by 3  Speech is intact coherent articulate  Cranial nerves III through XII are grossly intact  Motor exam is 5/5 both upper extremities  Lumbar spine mistreats full range of motion  Tender to palpation in the coccygeal region  Data straight leg lift  2+ distal pulses        Upper flexion extension films demonstrate relatively intact disc height and lumbar motion.  There is no evidence of listhesis.  There is migration of the spinal cord stimulator distal leads out of the epidural space in the subcutaneous and muscular region of the lumbar spine.     Assessment and Plan: At this time Mr. Jay suffers from chronic coccydynia.  He did respond to implantation of a spinal cord stimulator system.  Unfortunately this is migrated out of the epidural space.  We discussed today to proceed back with placement of an epidural paddle.  We did discuss the risk of the procedure being bleeding, death, paralysis, infection, CSF leak, failure the procedure need for the procedures.  He indicates he understands wishes to proceed.

## 2019-11-11 NOTE — OP NOTE
SPINAL CORD STIMULATOR INSERTION PHASE 2  Procedure Report    Patient Name:  Uday Jay  YOB: 1965    Date of Surgery:  11/5/2019     Indications: Mr. Jay is a 54-year-old gentleman who suffers from neuropathic pain and associated coccydynia.  He underwent placement of a spinal cord stimulator system for which she obtained good results.  The system migrated out of the epidural space.  He is here today for the removal of the migrated leads and implantation of an epidural paddle at T10.    Pre-op Diagnosis:   Coccydynia [M53.3]  Failed spinal cord stimulator (CMS/HCC) [T85.192A]       Post-Op Diagnosis Codes:     * Coccydynia [M53.3]     * Failed spinal cord stimulator (CMS/HCC) [T85.192A]    Procedure/CPT® Codes:  1.  Removal of failed spinal cord stimulator system  2.  T10 laminectomy and placement of spinal cord stimulator epidural paddle and connection to internal pulse generator (Abbot Penta paddle)  3.  Epidural adhesional lysis of epidural scar   4.  Intraoperative electrodiagnostic interrogation of spinal cord stim later system    Procedure(s):  T10 laminectomy and placement of spinal cord stimulator    Staff:  Surgeon(s):  Gianluca Miles MD         Anesthesia: General    Estimated Blood Loss: 40 ml    Implants:    Implant Name Type Inv. Item Serial No.  Lot No. LRB No. Used   LD STIM PENTA PADL KT 16CH 3MM 60CM - [id number] - [id number] Implant LD STIM PENTA PADL KT 16CH 3MM 60CM [id number] [employer]V [employer] SYS ANS . N/A 1   ANCHR LD SANDY LK 1192 - [id number] Implant ANCHR LD SANDY LK 1192  ADV NEUROMODULATIONS SYS ANS [id number] N/A 1       Specimen:          None        Findings: Dictated    Complications: None    Description of Procedure: The patient was taken to the operating theater where they were placed under general endotracheal anesthesia in the supine position.  The patient was then placed prone on the radiolucent operating table.  The arms were rested  comfortably at 90 degrees and the head was rested on a surgical pillow.  All pressure points were adequately padded and inspected.  The thoracolumbar and left lateral flank region was then prepped and draped in a sterile fashion.  A timeout was conducted in the room to confirm the appropriate patient, site of surgery, and procedure.  At this time a 10 blade scalpel was then used to make an incision over the previous incision site at the battery pocket.  The battery pocket was then opened using Bovie cautery.  The battery was then externalized.  The battery was then cut from the leads.  The battery was placed back on the back table to be used later in the surgery.  At this time a midline incision was then made at the lower lumbar area with anchor sites were.  Hemostasis was achieved Bovie cauterization.  Dissection was carried out with Bovie cautery to remove the Silastic anchors and the associated scar tissue.  The leads were then pulled out in their entirety.  At this time intraoperative fluoroscopy was brought into the AP plane.  Inspection did not demonstrate any similar system.  At this time the T10 level was identified.  A 10 blade scalpel was then used to make a midline incision.  Hemostasis was achieved Bovie cauterization.  Dissection was then carried down to the T10 lamina and subperiosteal dissection was then carried out to the medial aspect of the facets.  Self retaining retractors were then placed.  A Leksell rondure was then used to remove the spinous process and interspinous ligament.  High-speed drill was then used to drill away the inferior aspect of the lamina bilaterally.  Bleeding was controlled FloSeal.  The ligamentum flavum was then undermined with an up-biting cervical curette and removed with a 2 m Kerrison punch.  At this time a Penta paddle was then gently placed in the epidural space after adhesion lysis of the epidural scar was performed.  The paddle was then placed into position and AP  fluoroscopy demonstrated the paddle to be in good position.  The wound was copiously irrigated bacitracin irrigation.  A radiopaque anchor was then placed and secured in the subfascial space.  The fascia was then reapproximated with 0 Vicryl and a loose loop of the lead was then secured using 0 silk sutures in the epifascial space.  The leads were then tunneled to the battery pocket.  The distal ends of the leads were then connected to the battery.  The battery was placed back into the pocket after copious irrigation.  Intraoperative electrodiagnostic interrogation demonstrated all electrodiagnostics to be in appropriate limits.  At this time the wound scopes irrigated again bacitracin irrigation.  The subcutaneous tissue was then reapproximated with 0 Vicryl.  The skin incisions were then closed with 4-0 Monocryl.  Dressings were applied to the wound.  The patient was then placed back to the supine position and awakened from anesthesia.  The patient was then extubated taken to recovery without incident.      Gianluca Miles MD     Date: 11/11/2019  Time: 8:57 AM

## 2019-11-18 ENCOUNTER — OFFICE VISIT (OUTPATIENT)
Dept: NEUROSURGERY | Facility: CLINIC | Age: 54
End: 2019-11-18

## 2019-11-18 VITALS
HEIGHT: 70 IN | HEART RATE: 83 BPM | SYSTOLIC BLOOD PRESSURE: 114 MMHG | DIASTOLIC BLOOD PRESSURE: 73 MMHG | WEIGHT: 283.8 LBS | BODY MASS INDEX: 40.63 KG/M2

## 2019-11-18 DIAGNOSIS — M53.3 COCCYDYNIA: Primary | ICD-10-CM

## 2019-11-18 PROBLEM — E11.9 TYPE 2 DIABETES MELLITUS WITHOUT COMPLICATIONS (HCC): Status: ACTIVE | Noted: 2019-11-18

## 2019-11-18 PROBLEM — R07.89 PAIN OF STERNUM: Status: ACTIVE | Noted: 2019-11-18

## 2019-11-18 PROBLEM — Z96.659 PRESENCE OF ARTIFICIAL KNEE JOINT: Status: ACTIVE | Noted: 2019-11-18

## 2019-11-18 PROBLEM — I10 HYPERTENSION: Status: ACTIVE | Noted: 2019-11-18

## 2019-11-18 PROBLEM — I21.9 MYOCARDIAL INFARCTION: Status: ACTIVE | Noted: 2019-11-18

## 2019-11-18 PROBLEM — H52.4 PRESBYOPIA: Status: ACTIVE | Noted: 2019-11-18

## 2019-11-18 PROBLEM — H25.013 CORTICAL AGE-RELATED CATARACT, BILATERAL: Status: ACTIVE | Noted: 2019-11-18

## 2019-11-18 PROCEDURE — 99024 POSTOP FOLLOW-UP VISIT: CPT | Performed by: NEUROLOGICAL SURGERY

## 2019-11-18 NOTE — PROGRESS NOTES
"Subjective   Uday Jay is a 54 y.o. male.     Chief Complaint   Patient presents with   • Post-op     Placement SCS     Visit Vitals  /73   Pulse 83   Ht 177.8 cm (70\")   Wt 129 kg (283 lb 12.8 oz)   BMI 40.72 kg/m²       History of Present Illness:  Mr. Uday Jay is a 54-year-old male presents to the office today as a postop follow-up patient.  On 11/5/19 the patient underwent a placement of a spinal cord stimulator for the treatment of coccydynia.  He states he has had really good control of his pain.  His incisional pain is well controlled.    The following portions of the patient's history were reviewed and updated as appropriate: allergies, current medications, past family history, past medical history, past social history, past surgical history and problem list.    Review of Systems      Past Surgical History:   Procedure Laterality Date   • CARDIAC CATHETERIZATION     • CARDIAC SURGERY  2004   • CAROTID STENT     • CORONARY ARTERY BYPASS GRAFT     • KNEE ARTHROPLASTY, PARTIAL REPLACEMENT     • PENILE PROSTHESIS IMPLANT     • SPINAL CORD STIMULATOR IMPLANT     • SPINAL CORD STIMULATOR IMPLANT N/A 11/5/2019    Procedure: T10 laminectomy and placement of spinal cord stimulator;  Surgeon: Gianluca Miles MD;  Location: [facility];  Service: Neurosurgery       Past Medical History:   Diagnosis Date   • Coronary artery disease    • Diabetes (CMS/HCC)    • GERD (gastroesophageal reflux disease)    • Hyperlipidemia    • Hypertension    • Low back pain    • Sleep apnea        Objective   Physical Exam  Neurologic Exam  Ortho Exam    Incision is clean dry and intact and healing well  Focal neurologic deficits    Focused programming here today for mapping and programming with a total hour of intervention.  During the programming session changes were made in both amplitude, frequency, and pulse width.  The patient was given at least 3 amplitude settings and to frequency settings and the " pulse width was maintained at one setting.  Please refer to the media section for the complete detail programming report.      Assessment and Plan: Mr. Jay is doing quite well.  His staples were removed here today.  At this time he underwent successful programming and mapping here today.  He can follow back up with us on a as needed basis and further programming can be performed at his pain management office.      Problems Addressed this Visit     None

## 2020-05-15 ENCOUNTER — TELEPHONE (OUTPATIENT)
Dept: NEUROSURGERY | Facility: CLINIC | Age: 55
End: 2020-05-15

## 2020-05-15 NOTE — TELEPHONE ENCOUNTER
PATIENT CALLED WITH CONCERN ABOUT HIS BILL.  HE BELIEVES HE IS BEING CHARGED FOR SOMETHING HE DID NOT RECEIVE.  HE NEEDS A BETTER BREAK DOWN OF HIS PROCEDURE DONE 11/5/2019.  HE HAS CALLED BILLING AND WAS TOLD TO CALL THE PRACTICE FOR BETTER BREAKDOWN OF CHARGES.  THE BILLING CODE IN QUESTION IS . PLEASE CALL PATIENT AND ASSIST WITH HIS CONCERN.    [phone]

## 2020-05-20 ENCOUNTER — TELEPHONE (OUTPATIENT)
Dept: NEUROSURGERY | Facility: CLINIC | Age: 55
End: 2020-05-20

## 2020-05-22 NOTE — TELEPHONE ENCOUNTER
Spoke with Misti Azul with the Business Office regarding providing the patient with his requested itemized bill.   She said she would send it in the mail to him.

## 2020-06-22 NOTE — TELEPHONE ENCOUNTER
Pt says that he has a bill for $2,000 for a lead. He says that Peter casillas a rep for  the company that does the leads  said that the company only charges $4,000 for that lead.       Pt wants to know who coded this bill the office or the billing office. He wants to know if the office can get the code changed.   Uday  [phone]

## 2020-07-08 NOTE — TELEPHONE ENCOUNTER
This is out of my knowing, I have no idea the charge side of the surgeries, I only code them from what is given to me at the time surgery is discussed in office if anything is added or changed then it falls on the hospital,it was coded correctly on my end and Peter really should have not told the patient the above information, this is something that will need to be disputed between him, insurance carrier and hospital. I will inform patient of this

## 2020-07-08 NOTE — TELEPHONE ENCOUNTER
PT WANTS TO TALK TO JERONIMO ABOUT HIS BILL    PT IS CALLING ABOUT A LEAD THAT DR MINER PUT IN HIM. PT SAYS THAT HE SPOKE WITH ANNAMARIE THE REP FROM [employer] AND ANNAMARIE  INFORMED THE PT THAT HE IS BEING OVER CHARGED FOR THIS LEAD.      PT SAYS HE HAS SPOKEN WITH THE BILLING OFFICE AND THEY TOLD HIM IS WAS CODED CORRECTLY. HE WANTS TO KNOW WHO PUT THE CODE IN FOR THE LEAD.    MILES  [phone]

## 2020-09-24 ENCOUNTER — TRANSCRIBE ORDERS (OUTPATIENT)
Dept: CARDIOLOGY | Facility: HOSPITAL | Age: 55
End: 2020-09-24

## 2020-09-24 ENCOUNTER — TRANSCRIBE ORDERS (OUTPATIENT)
Dept: ADMINISTRATIVE | Facility: HOSPITAL | Age: 55
End: 2020-09-24

## 2020-09-24 DIAGNOSIS — R06.02 SHORTNESS OF BREATH: Primary | ICD-10-CM

## 2020-09-24 DIAGNOSIS — I42.9 CARDIOMYOPATHY, UNSPECIFIED TYPE (HCC): Primary | ICD-10-CM

## 2020-09-24 DIAGNOSIS — I65.23 CAROTID STENOSIS, ASYMPTOMATIC, BILATERAL: ICD-10-CM

## 2020-09-24 DIAGNOSIS — I25.10 CAD IN NATIVE ARTERY: ICD-10-CM

## 2020-10-05 ENCOUNTER — HOSPITAL ENCOUNTER (OUTPATIENT)
Dept: NUCLEAR MEDICINE | Facility: HOSPITAL | Age: 55
Discharge: HOME | End: 2020-10-05

## 2020-10-05 ENCOUNTER — APPOINTMENT (OUTPATIENT)
Dept: NUCLEAR MEDICINE | Facility: HOSPITAL | Age: 55
End: 2020-10-05

## 2020-10-05 DIAGNOSIS — I42.9 CARDIOMYOPATHY, UNSPECIFIED TYPE (HCC): ICD-10-CM

## 2020-10-05 PROCEDURE — 78494 HEART IMAGE SPECT: CPT | Performed by: INTERNAL MEDICINE

## 2020-10-05 PROCEDURE — A9538 TC99M PYROPHOSPHATE: HCPCS | Performed by: NURSE PRACTITIONER

## 2020-10-05 PROCEDURE — A9512 TC99M PERTECHNETATE: HCPCS | Performed by: NURSE PRACTITIONER

## 2020-10-05 PROCEDURE — 0 TECHNETIUM TC99M PYROPHOSPHATE: Performed by: NURSE PRACTITIONER

## 2020-10-05 PROCEDURE — 0 SODIUM PERTECHNETATE: Performed by: NURSE PRACTITIONER

## 2020-10-05 PROCEDURE — 78494 HEART IMAGE SPECT: CPT

## 2020-10-05 RX ADMIN — TECHNETIUM TC99M PYROPHOSPHATE 1 DOSE: 12 INJECTION INTRAVENOUS at 10:29

## 2020-10-05 RX ADMIN — TECHNETIUM TC-99M 25.8 MILLICURIE: 11 INJECTION, SOLUTION INTRAVENOUS at 11:00

## 2020-10-06 RX ORDER — LIDOCAINE 50 MG/G
1 PATCH TOPICAL DAILY PRN
Status: ON HOLD | COMMUNITY
Start: 2020-08-28 | End: 2021-08-13

## 2020-10-06 RX ORDER — RANOLAZINE 500 MG/1
1 TABLET, EXTENDED RELEASE ORAL EVERY 12 HOURS
COMMUNITY
End: 2020-10-19

## 2020-10-06 RX ORDER — AMLODIPINE BESYLATE 10 MG/1
1 TABLET ORAL
COMMUNITY
End: 2020-10-15

## 2020-10-06 RX ORDER — NEOMYCIN SULFATE, POLYMYXIN B SULFATE AND HYDROCORTISONE 10; 3.5; 1 MG/ML; MG/ML; [USP'U]/ML
SUSPENSION/ DROPS AURICULAR (OTIC)
COMMUNITY
Start: 2020-07-09 | End: 2020-10-19

## 2020-10-06 RX ORDER — ALOGLIPTIN 25 MG/1
1 TABLET, FILM COATED ORAL DAILY
COMMUNITY
Start: 2020-08-25

## 2020-10-06 RX ORDER — TAMSULOSIN HYDROCHLORIDE 0.4 MG/1
1 CAPSULE ORAL
COMMUNITY
Start: 2020-07-22 | End: 2021-08-12

## 2020-10-06 RX ORDER — SEMAGLUTIDE 1.34 MG/ML
1 INJECTION, SOLUTION SUBCUTANEOUS WEEKLY
COMMUNITY
Start: 2020-09-23 | End: 2023-03-29

## 2020-10-15 ENCOUNTER — CONSULT (OUTPATIENT)
Dept: CARDIOLOGY | Facility: CLINIC | Age: 55
End: 2020-10-15

## 2020-10-15 VITALS
HEIGHT: 70 IN | BODY MASS INDEX: 39.94 KG/M2 | WEIGHT: 279 LBS | OXYGEN SATURATION: 97 % | DIASTOLIC BLOOD PRESSURE: 76 MMHG | HEART RATE: 71 BPM | RESPIRATION RATE: 18 BRPM | SYSTOLIC BLOOD PRESSURE: 121 MMHG

## 2020-10-15 DIAGNOSIS — R93.1 ABNORMAL ECHOCARDIOGRAM: Primary | ICD-10-CM

## 2020-10-15 DIAGNOSIS — E66.01 MORBID OBESITY (HCC): ICD-10-CM

## 2020-10-15 DIAGNOSIS — E78.2 MIXED HYPERLIPIDEMIA: ICD-10-CM

## 2020-10-15 DIAGNOSIS — E11.59 TYPE 2 DIABETES MELLITUS WITH OTHER CIRCULATORY COMPLICATION, WITHOUT LONG-TERM CURRENT USE OF INSULIN (HCC): ICD-10-CM

## 2020-10-15 DIAGNOSIS — E78.00 HYPERCHOLESTEROLEMIA: ICD-10-CM

## 2020-10-15 DIAGNOSIS — Z95.1 S/P CABG (CORONARY ARTERY BYPASS GRAFT): ICD-10-CM

## 2020-10-15 DIAGNOSIS — I10 ESSENTIAL HYPERTENSION: ICD-10-CM

## 2020-10-15 PROCEDURE — 99215 OFFICE O/P EST HI 40 MIN: CPT | Performed by: INTERNAL MEDICINE

## 2020-10-15 PROCEDURE — 93000 ELECTROCARDIOGRAM COMPLETE: CPT | Performed by: INTERNAL MEDICINE

## 2020-10-15 RX ORDER — SACUBITRIL AND VALSARTAN 24; 26 MG/1; MG/1
1 TABLET, FILM COATED ORAL DAILY
COMMUNITY
End: 2021-08-12

## 2020-10-15 RX ORDER — NITROGLYCERIN 0.4 MG/1
0.4 TABLET SUBLINGUAL
Status: CANCELLED | OUTPATIENT
Start: 2020-10-15

## 2020-10-15 RX ORDER — ASPIRIN 81 MG/1
81 TABLET ORAL DAILY
Status: CANCELLED | OUTPATIENT
Start: 2020-10-15

## 2020-10-15 RX ORDER — PEN NEEDLE, DIABETIC 29 G X1/2"
NEEDLE, DISPOSABLE MISCELLANEOUS
COMMUNITY
Start: 2020-10-04

## 2020-10-15 RX ORDER — ONDANSETRON 2 MG/ML
4 INJECTION INTRAMUSCULAR; INTRAVENOUS EVERY 6 HOURS PRN
Status: CANCELLED | OUTPATIENT
Start: 2020-10-15

## 2020-10-15 RX ORDER — ASPIRIN 81 MG/1
324 TABLET, CHEWABLE ORAL ONCE
Status: CANCELLED | OUTPATIENT
Start: 2020-10-15

## 2020-10-15 RX ORDER — DIPHENHYDRAMINE HYDROCHLORIDE 50 MG/ML
50 INJECTION INTRAMUSCULAR; INTRAVENOUS ONCE
Status: CANCELLED | OUTPATIENT
Start: 2020-10-15 | End: 2020-10-15

## 2020-10-15 RX ORDER — HYDROCODONE BITARTRATE AND ACETAMINOPHEN 5; 325 MG/1; MG/1
1 TABLET ORAL EVERY 4 HOURS PRN
Status: CANCELLED | OUTPATIENT
Start: 2020-10-15 | End: 2020-10-25

## 2020-10-16 PROBLEM — R93.1 ABNORMAL ECHOCARDIOGRAM: Status: ACTIVE | Noted: 2020-10-16

## 2020-10-21 ENCOUNTER — APPOINTMENT (OUTPATIENT)
Dept: CARDIOLOGY | Facility: HOSPITAL | Age: 55
End: 2020-10-21

## 2020-10-21 ENCOUNTER — APPOINTMENT (OUTPATIENT)
Dept: GENERAL RADIOLOGY | Facility: HOSPITAL | Age: 55
End: 2020-10-21

## 2020-10-21 ENCOUNTER — LAB (OUTPATIENT)
Dept: LAB | Facility: HOSPITAL | Age: 55
End: 2020-10-21

## 2020-10-21 DIAGNOSIS — Z01.818 PREOP TESTING: Primary | ICD-10-CM

## 2020-10-21 LAB
ANION GAP SERPL CALCULATED.3IONS-SCNC: 10 MMOL/L (ref 5–15)
BASOPHILS # BLD AUTO: 0 10*3/MM3 (ref 0–0.2)
BASOPHILS NFR BLD AUTO: 0.7 % (ref 0–1.5)
BUN SERPL-MCNC: 14 MG/DL (ref 6–20)
BUN/CREAT SERPL: 17.9 (ref 7–25)
CALCIUM SPEC-SCNC: 9.5 MG/DL (ref 8.6–10.5)
CHLORIDE SERPL-SCNC: 102 MMOL/L (ref 98–107)
CHOLEST SERPL-MCNC: 159 MG/DL (ref 0–200)
CO2 SERPL-SCNC: 28 MMOL/L (ref 22–29)
CREAT SERPL-MCNC: 0.78 MG/DL (ref 0.76–1.27)
DEPRECATED RDW RBC AUTO: 47.7 FL (ref 37–54)
EOSINOPHIL # BLD AUTO: 0.1 10*3/MM3 (ref 0–0.4)
EOSINOPHIL NFR BLD AUTO: 1.2 % (ref 0.3–6.2)
ERYTHROCYTE [DISTWIDTH] IN BLOOD BY AUTOMATED COUNT: 15 % (ref 12.3–15.4)
GFR SERPL CREATININE-BSD FRML MDRD: 103 ML/MIN/1.73
GLUCOSE SERPL-MCNC: 109 MG/DL (ref 65–99)
HCT VFR BLD AUTO: 41.9 % (ref 37.5–51)
HDLC SERPL-MCNC: 46 MG/DL (ref 40–60)
HGB BLD-MCNC: 14 G/DL (ref 13–17.7)
INR PPP: 0.95 (ref 0.93–1.1)
LDLC SERPL CALC-MCNC: 71 MG/DL (ref 0–100)
LDLC/HDLC SERPL: 1.34 {RATIO}
LYMPHOCYTES # BLD AUTO: 1 10*3/MM3 (ref 0.7–3.1)
LYMPHOCYTES NFR BLD AUTO: 21.1 % (ref 19.6–45.3)
MCH RBC QN AUTO: 30.4 PG (ref 26.6–33)
MCHC RBC AUTO-ENTMCNC: 33.3 G/DL (ref 31.5–35.7)
MCV RBC AUTO: 91.4 FL (ref 79–97)
MONOCYTES # BLD AUTO: 0.4 10*3/MM3 (ref 0.1–0.9)
MONOCYTES NFR BLD AUTO: 9.4 % (ref 5–12)
NEUTROPHILS NFR BLD AUTO: 3.2 10*3/MM3 (ref 1.7–7)
NEUTROPHILS NFR BLD AUTO: 67.6 % (ref 42.7–76)
NRBC BLD AUTO-RTO: 0 /100 WBC (ref 0–0.2)
PLATELET # BLD AUTO: 185 10*3/MM3 (ref 140–450)
PMV BLD AUTO: 8.5 FL (ref 6–12)
POTASSIUM SERPL-SCNC: 4.4 MMOL/L (ref 3.5–5.2)
PROTHROMBIN TIME: 10.5 SECONDS (ref 9.6–11.7)
RBC # BLD AUTO: 4.59 10*6/MM3 (ref 4.14–5.8)
SODIUM SERPL-SCNC: 140 MMOL/L (ref 136–145)
TRIGL SERPL-MCNC: 257 MG/DL (ref 0–150)
VLDLC SERPL-MCNC: 42 MG/DL (ref 5–40)
WBC # BLD AUTO: 4.7 10*3/MM3 (ref 3.4–10.8)

## 2020-10-21 PROCEDURE — 85025 COMPLETE CBC W/AUTO DIFF WBC: CPT | Performed by: INTERNAL MEDICINE

## 2020-10-21 PROCEDURE — 80048 BASIC METABOLIC PNL TOTAL CA: CPT | Performed by: INTERNAL MEDICINE

## 2020-10-21 PROCEDURE — 85610 PROTHROMBIN TIME: CPT | Performed by: INTERNAL MEDICINE

## 2020-10-21 PROCEDURE — C9803 HOPD COVID-19 SPEC COLLECT: HCPCS | Performed by: INTERNAL MEDICINE

## 2020-10-21 PROCEDURE — 80061 LIPID PANEL: CPT | Performed by: INTERNAL MEDICINE

## 2020-10-21 PROCEDURE — U0004 COV-19 TEST NON-CDC HGH THRU: HCPCS | Performed by: INTERNAL MEDICINE

## 2020-10-22 LAB — SARS-COV-2 RNA RESP QL NAA+PROBE: NOT DETECTED

## 2020-10-23 ENCOUNTER — HOSPITAL ENCOUNTER (OUTPATIENT)
Facility: HOSPITAL | Age: 55
Setting detail: HOSPITAL OUTPATIENT SURGERY
Discharge: HOME | End: 2020-10-23
Attending: INTERNAL MEDICINE | Admitting: INTERNAL MEDICINE

## 2020-10-23 ENCOUNTER — APPOINTMENT (OUTPATIENT)
Dept: GENERAL RADIOLOGY | Facility: HOSPITAL | Age: 55
End: 2020-10-23

## 2020-10-23 VITALS
HEIGHT: 69 IN | RESPIRATION RATE: 16 BRPM | OXYGEN SATURATION: 95 % | BODY MASS INDEX: 41.08 KG/M2 | TEMPERATURE: 97.7 F | HEART RATE: 68 BPM | WEIGHT: 277.34 LBS | DIASTOLIC BLOOD PRESSURE: 73 MMHG | SYSTOLIC BLOOD PRESSURE: 138 MMHG

## 2020-10-23 DIAGNOSIS — R93.1 ABNORMAL ECHOCARDIOGRAM: ICD-10-CM

## 2020-10-23 LAB — GLUCOSE BLDC GLUCOMTR-MCNC: 154 MG/DL (ref 70–105)

## 2020-10-23 PROCEDURE — 25010000002 FENTANYL CITRATE (PF) 100 MCG/2ML SOLUTION: Performed by: INTERNAL MEDICINE

## 2020-10-23 PROCEDURE — C1769 GUIDE WIRE: HCPCS | Performed by: INTERNAL MEDICINE

## 2020-10-23 PROCEDURE — C1894 INTRO/SHEATH, NON-LASER: HCPCS | Performed by: INTERNAL MEDICINE

## 2020-10-23 PROCEDURE — 93459 L HRT ART/GRFT ANGIO: CPT | Performed by: INTERNAL MEDICINE

## 2020-10-23 PROCEDURE — 0 IOPAMIDOL PER 1 ML: Performed by: INTERNAL MEDICINE

## 2020-10-23 PROCEDURE — 25010000002 MIDAZOLAM PER 1 MG: Performed by: INTERNAL MEDICINE

## 2020-10-23 PROCEDURE — 93460 R&L HRT ART/VENTRICLE ANGIO: CPT | Performed by: INTERNAL MEDICINE

## 2020-10-23 PROCEDURE — 93458 L HRT ARTERY/VENTRICLE ANGIO: CPT | Performed by: INTERNAL MEDICINE

## 2020-10-23 PROCEDURE — 71046 X-RAY EXAM CHEST 2 VIEWS: CPT

## 2020-10-23 PROCEDURE — 99152 MOD SED SAME PHYS/QHP 5/>YRS: CPT | Performed by: INTERNAL MEDICINE

## 2020-10-23 PROCEDURE — 99153 MOD SED SAME PHYS/QHP EA: CPT | Performed by: INTERNAL MEDICINE

## 2020-10-23 PROCEDURE — 82962 GLUCOSE BLOOD TEST: CPT

## 2020-10-23 RX ORDER — FENTANYL CITRATE 50 UG/ML
INJECTION, SOLUTION INTRAMUSCULAR; INTRAVENOUS AS NEEDED
Status: DISCONTINUED | OUTPATIENT
Start: 2020-10-23 | End: 2020-10-23 | Stop reason: HOSPADM

## 2020-10-23 RX ORDER — SODIUM CHLORIDE 9 MG/ML
30 INJECTION, SOLUTION INTRAVENOUS CONTINUOUS
Status: DISCONTINUED | OUTPATIENT
Start: 2020-10-23 | End: 2020-10-23 | Stop reason: HOSPADM

## 2020-10-23 RX ORDER — MIDAZOLAM HYDROCHLORIDE 1 MG/ML
INJECTION INTRAMUSCULAR; INTRAVENOUS AS NEEDED
Status: DISCONTINUED | OUTPATIENT
Start: 2020-10-23 | End: 2020-10-23 | Stop reason: HOSPADM

## 2020-10-23 RX ORDER — ASPIRIN 81 MG/1
81 TABLET ORAL DAILY
Status: DISCONTINUED | OUTPATIENT
Start: 2020-10-24 | End: 2020-10-23 | Stop reason: HOSPADM

## 2020-10-23 RX ORDER — NITROGLYCERIN 0.4 MG/1
0.4 TABLET SUBLINGUAL
Status: DISCONTINUED | OUTPATIENT
Start: 2020-10-23 | End: 2020-10-23 | Stop reason: HOSPADM

## 2020-10-23 RX ORDER — DIPHENHYDRAMINE HYDROCHLORIDE 50 MG/ML
50 INJECTION INTRAMUSCULAR; INTRAVENOUS ONCE
Status: DISCONTINUED | OUTPATIENT
Start: 2020-10-23 | End: 2020-10-23 | Stop reason: HOSPADM

## 2020-10-23 RX ORDER — ONDANSETRON 2 MG/ML
4 INJECTION INTRAMUSCULAR; INTRAVENOUS EVERY 6 HOURS PRN
Status: DISCONTINUED | OUTPATIENT
Start: 2020-10-23 | End: 2020-10-23

## 2020-10-23 RX ORDER — ONDANSETRON 2 MG/ML
4 INJECTION INTRAMUSCULAR; INTRAVENOUS EVERY 6 HOURS PRN
Status: DISCONTINUED | OUTPATIENT
Start: 2020-10-23 | End: 2020-10-23 | Stop reason: HOSPADM

## 2020-10-23 RX ORDER — DIPHENHYDRAMINE HCL 25 MG
25 CAPSULE ORAL EVERY 6 HOURS PRN
Status: DISCONTINUED | OUTPATIENT
Start: 2020-10-23 | End: 2020-10-23 | Stop reason: HOSPADM

## 2020-10-23 RX ORDER — SODIUM CHLORIDE 9 MG/ML
250 INJECTION, SOLUTION INTRAVENOUS ONCE AS NEEDED
Status: DISCONTINUED | OUTPATIENT
Start: 2020-10-23 | End: 2020-10-23 | Stop reason: HOSPADM

## 2020-10-23 RX ORDER — FENTANYL CITRATE 50 UG/ML
50 INJECTION, SOLUTION INTRAMUSCULAR; INTRAVENOUS ONCE
Status: COMPLETED | OUTPATIENT
Start: 2020-10-23 | End: 2020-10-23

## 2020-10-23 RX ORDER — ALUMINA, MAGNESIA, AND SIMETHICONE 2400; 2400; 240 MG/30ML; MG/30ML; MG/30ML
15 SUSPENSION ORAL EVERY 6 HOURS PRN
Status: DISCONTINUED | OUTPATIENT
Start: 2020-10-23 | End: 2020-10-23 | Stop reason: HOSPADM

## 2020-10-23 RX ORDER — LIDOCAINE HYDROCHLORIDE 20 MG/ML
INJECTION, SOLUTION INFILTRATION; PERINEURAL AS NEEDED
Status: DISCONTINUED | OUTPATIENT
Start: 2020-10-23 | End: 2020-10-23 | Stop reason: HOSPADM

## 2020-10-23 RX ORDER — ASPIRIN 81 MG/1
324 TABLET, CHEWABLE ORAL ONCE
Status: DISCONTINUED | OUTPATIENT
Start: 2020-10-23 | End: 2020-10-23 | Stop reason: HOSPADM

## 2020-10-23 RX ORDER — ONDANSETRON 4 MG/1
4 TABLET, FILM COATED ORAL EVERY 6 HOURS PRN
Status: DISCONTINUED | OUTPATIENT
Start: 2020-10-23 | End: 2020-10-23 | Stop reason: HOSPADM

## 2020-10-23 RX ORDER — HYDROCODONE BITARTRATE AND ACETAMINOPHEN 5; 325 MG/1; MG/1
1 TABLET ORAL EVERY 4 HOURS PRN
Status: DISCONTINUED | OUTPATIENT
Start: 2020-10-23 | End: 2020-10-23 | Stop reason: HOSPADM

## 2020-10-23 RX ORDER — ASPIRIN 81 MG/1
81 TABLET ORAL DAILY
Status: DISCONTINUED | OUTPATIENT
Start: 2020-10-23 | End: 2020-10-23 | Stop reason: SDUPTHER

## 2020-10-23 RX ORDER — ACETAMINOPHEN 325 MG/1
650 TABLET ORAL EVERY 4 HOURS PRN
Status: DISCONTINUED | OUTPATIENT
Start: 2020-10-23 | End: 2020-10-23 | Stop reason: HOSPADM

## 2020-10-23 RX ORDER — SODIUM CHLORIDE 9 MG/ML
30 INJECTION, SOLUTION INTRAVENOUS CONTINUOUS
Status: DISPENSED | OUTPATIENT
Start: 2020-10-23 | End: 2020-10-23

## 2020-10-23 RX ORDER — METHYLPREDNISOLONE SODIUM SUCCINATE 125 MG/2ML
120 INJECTION, POWDER, LYOPHILIZED, FOR SOLUTION INTRAMUSCULAR; INTRAVENOUS ONCE
Status: DISCONTINUED | OUTPATIENT
Start: 2020-10-23 | End: 2020-10-23 | Stop reason: HOSPADM

## 2020-10-23 RX ADMIN — SODIUM CHLORIDE 30 ML/HR: 9 INJECTION, SOLUTION INTRAVENOUS at 09:06

## 2020-10-23 RX ADMIN — FENTANYL CITRATE 50 MCG: 50 INJECTION INTRAMUSCULAR; INTRAVENOUS at 12:01

## 2020-10-26 NOTE — TELEPHONE ENCOUNTER
Cardiac Rehab Referral noted.  Pt does not qualify with current diagnosis at this time.    ANTONIO MERLOS

## 2021-08-06 ENCOUNTER — PREP FOR SURGERY (OUTPATIENT)
Dept: OTHER | Facility: HOSPITAL | Age: 56
End: 2021-08-06

## 2021-08-06 ENCOUNTER — OFFICE VISIT (OUTPATIENT)
Dept: CARDIOLOGY | Facility: CLINIC | Age: 56
End: 2021-08-06

## 2021-08-06 VITALS
DIASTOLIC BLOOD PRESSURE: 71 MMHG | HEIGHT: 69 IN | OXYGEN SATURATION: 96 % | BODY MASS INDEX: 39.55 KG/M2 | SYSTOLIC BLOOD PRESSURE: 112 MMHG | HEART RATE: 73 BPM | WEIGHT: 267 LBS

## 2021-08-06 DIAGNOSIS — I25.10 ARTERIOSCLEROSIS OF CORONARY ARTERY: ICD-10-CM

## 2021-08-06 DIAGNOSIS — I10 ESSENTIAL HYPERTENSION: ICD-10-CM

## 2021-08-06 DIAGNOSIS — I25.119 CORONARY ARTERY DISEASE INVOLVING NATIVE CORONARY ARTERY OF NATIVE HEART WITH ANGINA PECTORIS (HCC): Primary | ICD-10-CM

## 2021-08-06 DIAGNOSIS — E78.2 MIXED HYPERLIPIDEMIA: ICD-10-CM

## 2021-08-06 DIAGNOSIS — Z95.1 S/P CABG (CORONARY ARTERY BYPASS GRAFT): Primary | ICD-10-CM

## 2021-08-06 PROCEDURE — 99215 OFFICE O/P EST HI 40 MIN: CPT | Performed by: INTERNAL MEDICINE

## 2021-08-06 RX ORDER — NITROGLYCERIN 0.4 MG/1
0.4 TABLET SUBLINGUAL
Status: CANCELLED | OUTPATIENT
Start: 2021-08-06

## 2021-08-06 RX ORDER — ASPIRIN 81 MG/1
324 TABLET, CHEWABLE ORAL ONCE
Status: CANCELLED | OUTPATIENT
Start: 2021-08-06 | End: 2021-08-06

## 2021-08-06 RX ORDER — ASPIRIN 81 MG/1
81 TABLET ORAL DAILY
Status: CANCELLED | OUTPATIENT
Start: 2021-08-06

## 2021-08-06 RX ORDER — NITROGLYCERIN 0.4 MG/1
0.4 TABLET SUBLINGUAL
COMMUNITY
Start: 2021-08-01

## 2021-08-06 RX ORDER — METHYLPREDNISOLONE SODIUM SUCCINATE 125 MG/2ML
120 INJECTION, POWDER, LYOPHILIZED, FOR SOLUTION INTRAMUSCULAR; INTRAVENOUS ONCE
Status: CANCELLED | OUTPATIENT
Start: 2021-08-06 | End: 2021-08-06

## 2021-08-06 RX ORDER — SODIUM CHLORIDE 0.9 % (FLUSH) 0.9 %
3 SYRINGE (ML) INJECTION EVERY 12 HOURS SCHEDULED
Status: CANCELLED | OUTPATIENT
Start: 2021-08-06

## 2021-08-06 RX ORDER — SODIUM CHLORIDE 0.9 % (FLUSH) 0.9 %
3-10 SYRINGE (ML) INJECTION AS NEEDED
Status: CANCELLED | OUTPATIENT
Start: 2021-08-06

## 2021-08-06 RX ORDER — ISOSORBIDE MONONITRATE 120 MG/1
120 TABLET, EXTENDED RELEASE ORAL 2 TIMES DAILY
COMMUNITY
Start: 2021-08-02

## 2021-08-06 RX ORDER — ONDANSETRON 2 MG/ML
4 INJECTION INTRAMUSCULAR; INTRAVENOUS EVERY 6 HOURS PRN
Status: CANCELLED | OUTPATIENT
Start: 2021-08-06

## 2021-08-06 RX ORDER — RANOLAZINE 1000 MG/1
1 TABLET, EXTENDED RELEASE ORAL 2 TIMES DAILY
COMMUNITY
Start: 2021-08-02

## 2021-08-06 RX ORDER — DIPHENHYDRAMINE HYDROCHLORIDE 50 MG/ML
50 INJECTION INTRAMUSCULAR; INTRAVENOUS ONCE
Status: CANCELLED | OUTPATIENT
Start: 2021-08-06 | End: 2021-08-06

## 2021-08-06 RX ORDER — EZETIMIBE 10 MG/1
10 TABLET ORAL DAILY
COMMUNITY
Start: 2021-06-01

## 2021-08-06 NOTE — H&P (VIEW-ONLY)
Cardiology Office Visit      Encounter Date:  08/06/2021    Patient ID:   Uday Jay is a 56 y.o. male.    Reason For Followup:  Coronary artery disease    Brief Clinical History:  Dear Jeana Robert APRN    I had the pleasure of seeing Uday Jay today. As you are well aware, this is a 56 y.o. male with a history of ischemic heart disease.  He is undergone four-vessel coronary arterial bypass grafting in November 2004.  He has additional history that includes hypertension, hypertensive cardiovascular disease, hyperlipidemia, diabetes mellitus, obstructive sleep apnea, chronic back pain, palpitations, and antiplatelet therapy.  He presents today for follow-up on the above conditions.    Interval History:  He is reporting some chest pain and discomfort.  He reports that predominantly it occurs with exertion however there are some episodes that occur at rest.  He has been uptitrated on his isosorbide and is now taking 1000 mg twice daily of Ranexa.  He notes that his symptoms are different and worse than when he presented last year.    As I am sure you will recall, he underwent a cardiac catheterization in October 2020.  The findings of his catheterization were quite disappointing.  He has severe native vessel coronary artery disease with complete occlusion of his LAD, circumflex, and RCA origins.  He has undergone four-vessel CABG in the past.  The vein graft to the circumflex has suffered attrition but the graft to the diagonal, RCA, and LAD remain.  The difficulty lies in the caliber of his native vasculature.  The arteries distal to the anastomoses are diffusely diseased and very small caliber.  His LAD has a total occlusion just beyond the anastomosis with reconstitution of the vessel via left to left collaterals.  The distal RCA and diagonal branches suffer from similar small caliber.  We pulled his films up today in the office and reviewed them together for reference.    After his  catheterization we had suggested EECP.  The patient reports that the schedule was untenable due to his work schedule.  He reports that he will be retiring in the next few months and he will be able to begin EECP if that is our continued recommendation.  Currently, we are suffering from staffing issues as a result of the pandemic and are currently unable to offer the EECP service.  This situation is fluid however and will resolve when our staffing issues have been resolved.    After reviewing the films and discussing the risks, benefits, and options, which include stroke, death, dissection, and no reflow, he wishes to pursue a repeat catheterization to see if there are any treatable changes.  If not, consideration for EECP and potential referral to tertiary center for research protocols should be entertained given the patient's very young age.    We were able to review his most recent laboratory data from your office dated 7/14/2021.  His lipid profile is within acceptable limits.  Total cholesterol was 126 LDL-C was 63 and HDL was 41.  Renal function panel was within normal limits with the exception of a slightly elevated total bilirubin.    Assessment & Plan    Impressions:  Coronary artery disease status post four-vessel CABG November 2004     Attrition of saphenous vein graft to circumflex system noted catheterization October 2020     Severe native vessel small caliber coronary arterial disease noted catheterization October 2020  Wearable cardiac defibrillator  Hypertension  Hypertensive cardiovascular disease  Hyperlipidemia  Diabetes mellitus  Palpitations  Obstructive sleep apnea  Chronic pain  Antiplatelet therapy    Recommendations:  Continuation of his current cardiovascular regimen at the present time.  Risk benefits and options discussed.  We will proceed with cardiac catheterization  Follow-up in 4 to 6 weeks    Objective:    Vitals:  Vitals:    08/06/21 0837   BP: 112/71   BP Location: Left arm  "  Patient Position: Sitting   Cuff Size: Adult   Pulse: 73   SpO2: 96%   Weight: 121 kg (267 lb)   Height: 175.3 cm (69\")       Physical Exam:    General: Alert, cooperative, no distress, appears stated age  Head:  Normocephalic, atraumatic, mucous membranes moist  Eyes:  Conjunctiva/corneas clear, EOM's intact     Neck:  Supple,  no bruit  Lungs: Clear to auscultation bilaterally, no wheezes rhonchi rales are noted  Chest wall: No tenderness  Heart::  Regular rate and rhythm, S1 and S2 normal, 1/6 holosystolic murmur.  No rub or gallop  Abdomen: Soft, non-tender, nondistended bowel sounds active.  Obese  Extremities: No cyanosis, clubbing, or edema  Pulses: 2+ and symmetric all extremities  Skin:  No rashes or lesions  Neuro/psych: A&O x3. CN II through XII are grossly intact with appropriate affect      Allergies:  Allergies   Allergen Reactions   • Acetaminophen Other (See Comments)   • Hydrocodone Other (See Comments)   • Ramipril Other (See Comments)       Medication Review:     Current Outpatient Medications:   •  Alogliptin Benzoate 25 MG tablet, Take 1 tablet by mouth Daily., Disp: , Rfl:   •  aspirin 81 MG EC tablet, Take 81 mg by mouth Daily. Last dose 10/25/19, Disp: , Rfl:   •  atorvastatin (LIPITOR) 80 MG tablet, Take 80 mg by mouth Daily., Disp: , Rfl:   •  BD Insulin Syringe U/F 31G X 5/16\" 1 ML misc, , Disp: , Rfl:   •  carvedilol (COREG) 25 MG tablet, Take 50 mg by mouth 2 (Two) Times a Day With Meals., Disp: , Rfl: 3  •  clopidogrel (PLAVIX) 75 MG tablet, Take 75 mg by mouth Daily. Last dose 10/25/19, Disp: , Rfl:   •  Empagliflozin (JARDIANCE) 10 MG tablet, Take 10 mg by mouth Daily., Disp: , Rfl:   •  escitalopram (LEXAPRO) 10 MG tablet, Take 10 mg by mouth Daily., Disp: , Rfl: 2  •  Evolocumab (REPATHA) 140 MG/ML solution prefilled syringe, Inject 1 mL under the skin into the appropriate area as directed Every 14 (Fourteen) Days., Disp: , Rfl:   •  ezetimibe (ZETIA) 10 MG tablet, , Disp: , Rfl: "   •  insulin glargine (LANTUS) 100 UNIT/ML injection, Inject 55 Units under the skin into the appropriate area as directed Every Night., Disp: , Rfl:   •  isosorbide mononitrate (IMDUR) 120 MG 24 hr tablet, Take 120 mg by mouth 2 (Two) Times a Day., Disp: , Rfl:   •  metFORMIN (GLUCOPHAGE) 1000 MG tablet, Take 1,000 mg by mouth 2 (Two) Times a Day., Disp: , Rfl: 1  •  nitroglycerin (NITROSTAT) 0.4 MG SL tablet, Place 0.4 mg under the tongue Every 5 (Five) Minutes As Needed. do not exceed a total of 3 doses in 15 minutes, Disp: , Rfl:   •  Ozempic, 1 MG/DOSE, 2 MG/1.5ML solution pen-injector, Inject 1 mg under the skin into the appropriate area as directed 1 (One) Time Per Week., Disp: , Rfl:   •  pantoprazole (PROTONIX) 40 MG EC tablet, Take 40 mg by mouth Daily., Disp: , Rfl:   •  ranolazine (RANEXA) 1000 MG 12 hr tablet, Take 1 tablet by mouth 2 (Two) Times a Day., Disp: , Rfl:   •  lidocaine (LIDODERM) 5 %, Place 1 patch on the skin as directed by provider Daily As Needed., Disp: , Rfl:   •  sacubitril-valsartan (Entresto) 24-26 MG tablet, Take 1 tablet by mouth Daily., Disp: , Rfl:   •  tamsulosin (FLOMAX) 0.4 MG capsule 24 hr capsule, TAKE 1 CAPSULE BY MOUTH ONCE DAILY AT NIGHT AT BEDTIME, Disp: , Rfl:     Family History:  Family History   Problem Relation Age of Onset   • Heart disease Mother        Past Medical History:  Past Medical History:   Diagnosis Date   • Coronary artery disease    • Diabetes (CMS/HCC)    • GERD (gastroesophageal reflux disease)    • Hyperlipidemia    • Hypertension    • Low back pain    • Sleep apnea        Past surgical History:  Past Surgical History:   Procedure Laterality Date   • CARDIAC CATHETERIZATION     • CARDIAC CATHETERIZATION Left 10/23/2020    Procedure: Cardiac Catheterization/Vascular Study;  Surgeon: Adolfo Vogel DO;  Location: [facility] INVASIVE LOCATION;  Service: Cardiology;  Laterality: Left;   • CARDIAC SURGERY  2004   • CAROTID STENT     •  CORONARY ARTERY BYPASS GRAFT     • KNEE ARTHROPLASTY, PARTIAL REPLACEMENT     • PENILE PROSTHESIS IMPLANT     • SPINAL CORD STIMULATOR IMPLANT     • SPINAL CORD STIMULATOR IMPLANT N/A 2019    Procedure: T10 laminectomy and placement of spinal cord stimulator;  Surgeon: Gianluca Miles MD;  Location: [facility] OR;  Service: Neurosurgery       Social History:  Social History     Socioeconomic History   • Marital status:      Spouse name: Not on file   • Number of children: Not on file   • Years of education: Not on file   • Highest education level: Not on file   Tobacco Use   • Smoking status: Former Smoker     Types: Cigarettes     Quit date:      Years since quittin.6   • Smokeless tobacco: Never Used   Vaping Use   • Vaping Use: Never used   Substance and Sexual Activity   • Alcohol use: Yes     Comment: occ   • Drug use: No   • Sexual activity: Defer       Review of Systems:  The following systems were reviewed as they relate to the cardiovascular system: Constitutional, Eyes, ENT, Cardiovascular, Respiratory, Gastrointestinal, Integumentary, Neurological, Psychiatric, Hematologic, Endocrine, Musculoskeletal, and Genitourinary. The pertinent cardiovascular findings are reported above with all other cardiovascular points within those systems being negative.    Diagnostic Study Review:     Current Electrocardiogram:  Procedures no new EKG.  EKG dated 2021 demonstrates sinus rhythm with a ventricular rate of 64 bpm.  Right bundle branch block.  Prolonged QT and QTc intervals of 460 and 475 ms respectively.      NOTE: The following portions of the patient's note were reviewed, confirmed and/or updated this visit as appropriate: History of present illness/Interval history, physical examination, assessment & plan, allergies, current medications, past family history, past medical history, past social history, past surgical history and problem list.

## 2021-08-06 NOTE — PROGRESS NOTES
Cardiology Office Visit      Encounter Date:  08/06/2021    Patient ID:   Uday Jay is a 56 y.o. male.    Reason For Followup:  Coronary artery disease    Brief Clinical History:  Dear Jeana Robert APRN    I had the pleasure of seeing Uday Jay today. As you are well aware, this is a 56 y.o. male with a history of ischemic heart disease.  He is undergone four-vessel coronary arterial bypass grafting in November 2004.  He has additional history that includes hypertension, hypertensive cardiovascular disease, hyperlipidemia, diabetes mellitus, obstructive sleep apnea, chronic back pain, palpitations, and antiplatelet therapy.  He presents today for follow-up on the above conditions.    Interval History:  He is reporting some chest pain and discomfort.  He reports that predominantly it occurs with exertion however there are some episodes that occur at rest.  He has been uptitrated on his isosorbide and is now taking 1000 mg twice daily of Ranexa.  He notes that his symptoms are different and worse than when he presented last year.    As I am sure you will recall, he underwent a cardiac catheterization in October 2020.  The findings of his catheterization were quite disappointing.  He has severe native vessel coronary artery disease with complete occlusion of his LAD, circumflex, and RCA origins.  He has undergone four-vessel CABG in the past.  The vein graft to the circumflex has suffered attrition but the graft to the diagonal, RCA, and LAD remain.  The difficulty lies in the caliber of his native vasculature.  The arteries distal to the anastomoses are diffusely diseased and very small caliber.  His LAD has a total occlusion just beyond the anastomosis with reconstitution of the vessel via left to left collaterals.  The distal RCA and diagonal branches suffer from similar small caliber.  We pulled his films up today in the office and reviewed them together for reference.    After his  catheterization we had suggested EECP.  The patient reports that the schedule was untenable due to his work schedule.  He reports that he will be retiring in the next few months and he will be able to begin EECP if that is our continued recommendation.  Currently, we are suffering from staffing issues as a result of the pandemic and are currently unable to offer the EECP service.  This situation is fluid however and will resolve when our staffing issues have been resolved.    After reviewing the films and discussing the risks, benefits, and options, which include stroke, death, dissection, and no reflow, he wishes to pursue a repeat catheterization to see if there are any treatable changes.  If not, consideration for EECP and potential referral to tertiary center for research protocols should be entertained given the patient's very young age.    We were able to review his most recent laboratory data from your office dated 7/14/2021.  His lipid profile is within acceptable limits.  Total cholesterol was 126 LDL-C was 63 and HDL was 41.  Renal function panel was within normal limits with the exception of a slightly elevated total bilirubin.    Assessment & Plan    Impressions:  Coronary artery disease status post four-vessel CABG November 2004     Attrition of saphenous vein graft to circumflex system noted catheterization October 2020     Severe native vessel small caliber coronary arterial disease noted catheterization October 2020  Wearable cardiac defibrillator  Hypertension  Hypertensive cardiovascular disease  Hyperlipidemia  Diabetes mellitus  Palpitations  Obstructive sleep apnea  Chronic pain  Antiplatelet therapy    Recommendations:  Continuation of his current cardiovascular regimen at the present time.  Risk benefits and options discussed.  We will proceed with cardiac catheterization  Follow-up in 4 to 6 weeks    Objective:    Vitals:  Vitals:    08/06/21 0837   BP: 112/71   BP Location: Left arm  "  Patient Position: Sitting   Cuff Size: Adult   Pulse: 73   SpO2: 96%   Weight: 121 kg (267 lb)   Height: 175.3 cm (69\")       Physical Exam:    General: Alert, cooperative, no distress, appears stated age  Head:  Normocephalic, atraumatic, mucous membranes moist  Eyes:  Conjunctiva/corneas clear, EOM's intact     Neck:  Supple,  no bruit  Lungs: Clear to auscultation bilaterally, no wheezes rhonchi rales are noted  Chest wall: No tenderness  Heart::  Regular rate and rhythm, S1 and S2 normal, 1/6 holosystolic murmur.  No rub or gallop  Abdomen: Soft, non-tender, nondistended bowel sounds active.  Obese  Extremities: No cyanosis, clubbing, or edema  Pulses: 2+ and symmetric all extremities  Skin:  No rashes or lesions  Neuro/psych: A&O x3. CN II through XII are grossly intact with appropriate affect      Allergies:  Allergies   Allergen Reactions   • Acetaminophen Other (See Comments)   • Hydrocodone Other (See Comments)   • Ramipril Other (See Comments)       Medication Review:     Current Outpatient Medications:   •  Alogliptin Benzoate 25 MG tablet, Take 1 tablet by mouth Daily., Disp: , Rfl:   •  aspirin 81 MG EC tablet, Take 81 mg by mouth Daily. Last dose 10/25/19, Disp: , Rfl:   •  atorvastatin (LIPITOR) 80 MG tablet, Take 80 mg by mouth Daily., Disp: , Rfl:   •  BD Insulin Syringe U/F 31G X 5/16\" 1 ML misc, , Disp: , Rfl:   •  carvedilol (COREG) 25 MG tablet, Take 50 mg by mouth 2 (Two) Times a Day With Meals., Disp: , Rfl: 3  •  clopidogrel (PLAVIX) 75 MG tablet, Take 75 mg by mouth Daily. Last dose 10/25/19, Disp: , Rfl:   •  Empagliflozin (JARDIANCE) 10 MG tablet, Take 10 mg by mouth Daily., Disp: , Rfl:   •  escitalopram (LEXAPRO) 10 MG tablet, Take 10 mg by mouth Daily., Disp: , Rfl: 2  •  Evolocumab (REPATHA) 140 MG/ML solution prefilled syringe, Inject 1 mL under the skin into the appropriate area as directed Every 14 (Fourteen) Days., Disp: , Rfl:   •  ezetimibe (ZETIA) 10 MG tablet, , Disp: , Rfl: "   •  insulin glargine (LANTUS) 100 UNIT/ML injection, Inject 55 Units under the skin into the appropriate area as directed Every Night., Disp: , Rfl:   •  isosorbide mononitrate (IMDUR) 120 MG 24 hr tablet, Take 120 mg by mouth 2 (Two) Times a Day., Disp: , Rfl:   •  metFORMIN (GLUCOPHAGE) 1000 MG tablet, Take 1,000 mg by mouth 2 (Two) Times a Day., Disp: , Rfl: 1  •  nitroglycerin (NITROSTAT) 0.4 MG SL tablet, Place 0.4 mg under the tongue Every 5 (Five) Minutes As Needed. do not exceed a total of 3 doses in 15 minutes, Disp: , Rfl:   •  Ozempic, 1 MG/DOSE, 2 MG/1.5ML solution pen-injector, Inject 1 mg under the skin into the appropriate area as directed 1 (One) Time Per Week., Disp: , Rfl:   •  pantoprazole (PROTONIX) 40 MG EC tablet, Take 40 mg by mouth Daily., Disp: , Rfl:   •  ranolazine (RANEXA) 1000 MG 12 hr tablet, Take 1 tablet by mouth 2 (Two) Times a Day., Disp: , Rfl:   •  lidocaine (LIDODERM) 5 %, Place 1 patch on the skin as directed by provider Daily As Needed., Disp: , Rfl:   •  sacubitril-valsartan (Entresto) 24-26 MG tablet, Take 1 tablet by mouth Daily., Disp: , Rfl:   •  tamsulosin (FLOMAX) 0.4 MG capsule 24 hr capsule, TAKE 1 CAPSULE BY MOUTH ONCE DAILY AT NIGHT AT BEDTIME, Disp: , Rfl:     Family History:  Family History   Problem Relation Age of Onset   • Heart disease Mother        Past Medical History:  Past Medical History:   Diagnosis Date   • Coronary artery disease    • Diabetes (CMS/HCC)    • GERD (gastroesophageal reflux disease)    • Hyperlipidemia    • Hypertension    • Low back pain    • Sleep apnea        Past surgical History:  Past Surgical History:   Procedure Laterality Date   • CARDIAC CATHETERIZATION     • CARDIAC CATHETERIZATION Left 10/23/2020    Procedure: Cardiac Catheterization/Vascular Study;  Surgeon: Adolfo Vogel DO;  Location: [facility] INVASIVE LOCATION;  Service: Cardiology;  Laterality: Left;   • CARDIAC SURGERY  2004   • CAROTID STENT     •  CORONARY ARTERY BYPASS GRAFT     • KNEE ARTHROPLASTY, PARTIAL REPLACEMENT     • PENILE PROSTHESIS IMPLANT     • SPINAL CORD STIMULATOR IMPLANT     • SPINAL CORD STIMULATOR IMPLANT N/A 2019    Procedure: T10 laminectomy and placement of spinal cord stimulator;  Surgeon: Gianluca Miles MD;  Location: [facility] OR;  Service: Neurosurgery       Social History:  Social History     Socioeconomic History   • Marital status:      Spouse name: Not on file   • Number of children: Not on file   • Years of education: Not on file   • Highest education level: Not on file   Tobacco Use   • Smoking status: Former Smoker     Types: Cigarettes     Quit date:      Years since quittin.6   • Smokeless tobacco: Never Used   Vaping Use   • Vaping Use: Never used   Substance and Sexual Activity   • Alcohol use: Yes     Comment: occ   • Drug use: No   • Sexual activity: Defer       Review of Systems:  The following systems were reviewed as they relate to the cardiovascular system: Constitutional, Eyes, ENT, Cardiovascular, Respiratory, Gastrointestinal, Integumentary, Neurological, Psychiatric, Hematologic, Endocrine, Musculoskeletal, and Genitourinary. The pertinent cardiovascular findings are reported above with all other cardiovascular points within those systems being negative.    Diagnostic Study Review:     Current Electrocardiogram:  Procedures no new EKG.  EKG dated 2021 demonstrates sinus rhythm with a ventricular rate of 64 bpm.  Right bundle branch block.  Prolonged QT and QTc intervals of 460 and 475 ms respectively.      NOTE: The following portions of the patient's note were reviewed, confirmed and/or updated this visit as appropriate: History of present illness/Interval history, physical examination, assessment & plan, allergies, current medications, past family history, past medical history, past social history, past surgical history and problem list.

## 2021-08-13 ENCOUNTER — LAB (OUTPATIENT)
Dept: LAB | Facility: HOSPITAL | Age: 56
End: 2021-08-13

## 2021-08-13 ENCOUNTER — HOSPITAL ENCOUNTER (OUTPATIENT)
Dept: CARDIOLOGY | Facility: HOSPITAL | Age: 56
Discharge: HOME | End: 2021-08-13

## 2021-08-13 ENCOUNTER — HOSPITAL ENCOUNTER (OUTPATIENT)
Dept: GENERAL RADIOLOGY | Facility: HOSPITAL | Age: 56
Discharge: HOME | End: 2021-08-13

## 2021-08-13 ENCOUNTER — HOSPITAL ENCOUNTER (OUTPATIENT)
Facility: HOSPITAL | Age: 56
Discharge: HOME | End: 2021-08-14
Attending: INTERNAL MEDICINE | Admitting: INTERNAL MEDICINE

## 2021-08-13 DIAGNOSIS — I25.119 CORONARY ARTERY DISEASE INVOLVING NATIVE CORONARY ARTERY OF NATIVE HEART WITH ANGINA PECTORIS (HCC): ICD-10-CM

## 2021-08-13 LAB
ACT BLD: 164 SECONDS (ref 89–137)
ACT BLD: 186 SECONDS (ref 89–137)
ACT BLD: 191 SECONDS (ref 89–137)
ANION GAP SERPL CALCULATED.3IONS-SCNC: 10 MMOL/L (ref 5–15)
BASOPHILS # BLD AUTO: 0.1 10*3/MM3 (ref 0–0.2)
BASOPHILS NFR BLD AUTO: 1.4 % (ref 0–1.5)
BUN SERPL-MCNC: 14 MG/DL (ref 6–20)
BUN/CREAT SERPL: 14.4 (ref 7–25)
CALCIUM SPEC-SCNC: 9.2 MG/DL (ref 8.6–10.5)
CHLORIDE SERPL-SCNC: 100 MMOL/L (ref 98–107)
CHOLEST SERPL-MCNC: 113 MG/DL (ref 0–200)
CO2 SERPL-SCNC: 29 MMOL/L (ref 22–29)
CREAT SERPL-MCNC: 0.97 MG/DL (ref 0.76–1.27)
DEPRECATED RDW RBC AUTO: 47.3 FL (ref 37–54)
EOSINOPHIL # BLD AUTO: 0.1 10*3/MM3 (ref 0–0.4)
EOSINOPHIL NFR BLD AUTO: 2.1 % (ref 0.3–6.2)
ERYTHROCYTE [DISTWIDTH] IN BLOOD BY AUTOMATED COUNT: 15.2 % (ref 12.3–15.4)
GFR SERPL CREATININE-BSD FRML MDRD: 80 ML/MIN/1.73
GLUCOSE BLDC GLUCOMTR-MCNC: 110 MG/DL (ref 70–105)
GLUCOSE BLDC GLUCOMTR-MCNC: 119 MG/DL (ref 70–105)
GLUCOSE SERPL-MCNC: 119 MG/DL (ref 65–99)
HCT VFR BLD AUTO: 38.1 % (ref 37.5–51)
HDLC SERPL-MCNC: 38 MG/DL (ref 40–60)
HGB BLD-MCNC: 12.9 G/DL (ref 13–17.7)
INR PPP: 0.99 (ref 0.93–1.1)
LDLC SERPL CALC-MCNC: 51 MG/DL (ref 0–100)
LDLC/HDLC SERPL: 1.25 {RATIO}
LYMPHOCYTES # BLD AUTO: 1.1 10*3/MM3 (ref 0.7–3.1)
LYMPHOCYTES NFR BLD AUTO: 24.6 % (ref 19.6–45.3)
MCH RBC QN AUTO: 29.8 PG (ref 26.6–33)
MCHC RBC AUTO-ENTMCNC: 33.8 G/DL (ref 31.5–35.7)
MCV RBC AUTO: 88 FL (ref 79–97)
MONOCYTES # BLD AUTO: 0.5 10*3/MM3 (ref 0.1–0.9)
MONOCYTES NFR BLD AUTO: 11.6 % (ref 5–12)
NEUTROPHILS NFR BLD AUTO: 2.7 10*3/MM3 (ref 1.7–7)
NEUTROPHILS NFR BLD AUTO: 60.3 % (ref 42.7–76)
NRBC BLD AUTO-RTO: 0.1 /100 WBC (ref 0–0.2)
PLATELET # BLD AUTO: 164 10*3/MM3 (ref 140–450)
PMV BLD AUTO: 8.1 FL (ref 6–12)
POTASSIUM SERPL-SCNC: 4 MMOL/L (ref 3.5–5.2)
PROTHROMBIN TIME: 11 SECONDS (ref 9.6–11.7)
RBC # BLD AUTO: 4.33 10*6/MM3 (ref 4.14–5.8)
SODIUM SERPL-SCNC: 139 MMOL/L (ref 136–145)
TRIGL SERPL-MCNC: 137 MG/DL (ref 0–150)
VLDLC SERPL-MCNC: 24 MG/DL (ref 5–40)
WBC # BLD AUTO: 4.5 10*3/MM3 (ref 3.4–10.8)

## 2021-08-13 PROCEDURE — 99152 MOD SED SAME PHYS/QHP 5/>YRS: CPT | Performed by: INTERNAL MEDICINE

## 2021-08-13 PROCEDURE — G0378 HOSPITAL OBSERVATION PER HR: HCPCS

## 2021-08-13 PROCEDURE — 36415 COLL VENOUS BLD VENIPUNCTURE: CPT

## 2021-08-13 PROCEDURE — 93459 L HRT ART/GRFT ANGIO: CPT | Performed by: INTERNAL MEDICINE

## 2021-08-13 PROCEDURE — 85610 PROTHROMBIN TIME: CPT

## 2021-08-13 PROCEDURE — C1769 GUIDE WIRE: HCPCS | Performed by: INTERNAL MEDICINE

## 2021-08-13 PROCEDURE — 82962 GLUCOSE BLOOD TEST: CPT

## 2021-08-13 PROCEDURE — 85347 COAGULATION TIME ACTIVATED: CPT

## 2021-08-13 PROCEDURE — 63710000001 INSULIN GLARGINE PER 5 UNITS: Performed by: INTERNAL MEDICINE

## 2021-08-13 PROCEDURE — 99153 MOD SED SAME PHYS/QHP EA: CPT | Performed by: INTERNAL MEDICINE

## 2021-08-13 PROCEDURE — 80048 BASIC METABOLIC PNL TOTAL CA: CPT

## 2021-08-13 PROCEDURE — C1874 STENT, COATED/COV W/DEL SYS: HCPCS | Performed by: INTERNAL MEDICINE

## 2021-08-13 PROCEDURE — 92937 PRQ TRLUML REVSC CAB GRF 1: CPT | Performed by: INTERNAL MEDICINE

## 2021-08-13 PROCEDURE — C1725 CATH, TRANSLUMIN NON-LASER: HCPCS | Performed by: INTERNAL MEDICINE

## 2021-08-13 PROCEDURE — 71046 X-RAY EXAM CHEST 2 VIEWS: CPT

## 2021-08-13 PROCEDURE — C9604 PERC D-E COR REVASC T CABG S: HCPCS | Performed by: INTERNAL MEDICINE

## 2021-08-13 PROCEDURE — 25010000002 HEPARIN (PORCINE) PER 1000 UNITS: Performed by: INTERNAL MEDICINE

## 2021-08-13 PROCEDURE — 85025 COMPLETE CBC W/AUTO DIFF WBC: CPT

## 2021-08-13 PROCEDURE — 80061 LIPID PANEL: CPT

## 2021-08-13 PROCEDURE — C1894 INTRO/SHEATH, NON-LASER: HCPCS | Performed by: INTERNAL MEDICINE

## 2021-08-13 PROCEDURE — 25010000002 MIDAZOLAM PER 1 MG: Performed by: INTERNAL MEDICINE

## 2021-08-13 PROCEDURE — 25010000002 FENTANYL CITRATE (PF) 100 MCG/2ML SOLUTION: Performed by: INTERNAL MEDICINE

## 2021-08-13 PROCEDURE — 0 IOPAMIDOL PER 1 ML: Performed by: INTERNAL MEDICINE

## 2021-08-13 PROCEDURE — C1887 CATHETER, GUIDING: HCPCS | Performed by: INTERNAL MEDICINE

## 2021-08-13 DEVICE — STNT CORNRY RESOLUTE ONYX RX 2X12MM: Type: IMPLANTABLE DEVICE | Status: FUNCTIONAL

## 2021-08-13 RX ORDER — ASPIRIN 325 MG
325 TABLET, DELAYED RELEASE (ENTERIC COATED) ORAL DAILY
Status: DISCONTINUED | OUTPATIENT
Start: 2021-08-13 | End: 2021-08-14

## 2021-08-13 RX ORDER — SODIUM CHLORIDE 9 MG/ML
30 INJECTION, SOLUTION INTRAVENOUS CONTINUOUS
Status: DISPENSED | OUTPATIENT
Start: 2021-08-13 | End: 2021-08-13

## 2021-08-13 RX ORDER — FENTANYL CITRATE 50 UG/ML
INJECTION, SOLUTION INTRAMUSCULAR; INTRAVENOUS AS NEEDED
Status: DISCONTINUED | OUTPATIENT
Start: 2021-08-13 | End: 2021-08-13 | Stop reason: HOSPADM

## 2021-08-13 RX ORDER — ALUMINA, MAGNESIA, AND SIMETHICONE 2400; 2400; 240 MG/30ML; MG/30ML; MG/30ML
15 SUSPENSION ORAL EVERY 6 HOURS PRN
Status: DISCONTINUED | OUTPATIENT
Start: 2021-08-13 | End: 2021-08-14 | Stop reason: HOSPADM

## 2021-08-13 RX ORDER — PANTOPRAZOLE SODIUM 40 MG/1
40 TABLET, DELAYED RELEASE ORAL DAILY
Status: DISCONTINUED | OUTPATIENT
Start: 2021-08-14 | End: 2021-08-14 | Stop reason: HOSPADM

## 2021-08-13 RX ORDER — ASPIRIN 81 MG/1
81 TABLET ORAL DAILY
Status: DISCONTINUED | OUTPATIENT
Start: 2021-08-14 | End: 2021-08-14 | Stop reason: HOSPADM

## 2021-08-13 RX ORDER — RANOLAZINE 500 MG/1
1000 TABLET, EXTENDED RELEASE ORAL 2 TIMES DAILY
Status: DISCONTINUED | OUTPATIENT
Start: 2021-08-13 | End: 2021-08-14 | Stop reason: HOSPADM

## 2021-08-13 RX ORDER — ONDANSETRON 2 MG/ML
4 INJECTION INTRAMUSCULAR; INTRAVENOUS EVERY 6 HOURS PRN
Status: DISCONTINUED | OUTPATIENT
Start: 2021-08-13 | End: 2021-08-14 | Stop reason: HOSPADM

## 2021-08-13 RX ORDER — CLOPIDOGREL BISULFATE 75 MG/1
TABLET ORAL AS NEEDED
Status: DISCONTINUED | OUTPATIENT
Start: 2021-08-13 | End: 2021-08-13 | Stop reason: HOSPADM

## 2021-08-13 RX ORDER — ATORVASTATIN CALCIUM 40 MG/1
80 TABLET, FILM COATED ORAL NIGHTLY
Status: DISCONTINUED | OUTPATIENT
Start: 2021-08-13 | End: 2021-08-14 | Stop reason: HOSPADM

## 2021-08-13 RX ORDER — CARVEDILOL 25 MG/1
25 TABLET ORAL 2 TIMES DAILY WITH MEALS
Status: DISCONTINUED | OUTPATIENT
Start: 2021-08-13 | End: 2021-08-14 | Stop reason: HOSPADM

## 2021-08-13 RX ORDER — ONDANSETRON 4 MG/1
4 TABLET, FILM COATED ORAL EVERY 6 HOURS PRN
Status: DISCONTINUED | OUTPATIENT
Start: 2021-08-13 | End: 2021-08-14 | Stop reason: HOSPADM

## 2021-08-13 RX ORDER — MIDAZOLAM HYDROCHLORIDE 1 MG/ML
INJECTION INTRAMUSCULAR; INTRAVENOUS AS NEEDED
Status: DISCONTINUED | OUTPATIENT
Start: 2021-08-13 | End: 2021-08-13 | Stop reason: HOSPADM

## 2021-08-13 RX ORDER — ESCITALOPRAM OXALATE 10 MG/1
10 TABLET ORAL DAILY
Status: DISCONTINUED | OUTPATIENT
Start: 2021-08-14 | End: 2021-08-14 | Stop reason: HOSPADM

## 2021-08-13 RX ORDER — SODIUM CHLORIDE 9 MG/ML
250 INJECTION, SOLUTION INTRAVENOUS ONCE AS NEEDED
Status: DISCONTINUED | OUTPATIENT
Start: 2021-08-13 | End: 2021-08-14 | Stop reason: HOSPADM

## 2021-08-13 RX ORDER — INSULIN GLARGINE 100 [IU]/ML
55 INJECTION, SOLUTION SUBCUTANEOUS NIGHTLY
Status: DISCONTINUED | OUTPATIENT
Start: 2021-08-13 | End: 2021-08-14 | Stop reason: HOSPADM

## 2021-08-13 RX ORDER — DIPHENHYDRAMINE HCL 25 MG
25 CAPSULE ORAL EVERY 6 HOURS PRN
Status: DISCONTINUED | OUTPATIENT
Start: 2021-08-13 | End: 2021-08-14 | Stop reason: HOSPADM

## 2021-08-13 RX ORDER — NITROGLYCERIN 0.4 MG/1
0.4 TABLET SUBLINGUAL
Status: DISCONTINUED | OUTPATIENT
Start: 2021-08-13 | End: 2021-08-14 | Stop reason: HOSPADM

## 2021-08-13 RX ORDER — ISOSORBIDE MONONITRATE 60 MG/1
120 TABLET, EXTENDED RELEASE ORAL
Status: DISCONTINUED | OUTPATIENT
Start: 2021-08-13 | End: 2021-08-14 | Stop reason: HOSPADM

## 2021-08-13 RX ORDER — HEPARIN SODIUM 1000 [USP'U]/ML
INJECTION, SOLUTION INTRAVENOUS; SUBCUTANEOUS AS NEEDED
Status: DISCONTINUED | OUTPATIENT
Start: 2021-08-13 | End: 2021-08-13 | Stop reason: HOSPADM

## 2021-08-13 RX ORDER — OXYCODONE HYDROCHLORIDE 5 MG/1
5 TABLET ORAL EVERY 4 HOURS PRN
Status: DISCONTINUED | OUTPATIENT
Start: 2021-08-13 | End: 2021-08-14 | Stop reason: HOSPADM

## 2021-08-13 RX ORDER — ACETAMINOPHEN 325 MG/1
650 TABLET ORAL EVERY 4 HOURS PRN
Status: DISCONTINUED | OUTPATIENT
Start: 2021-08-13 | End: 2021-08-14 | Stop reason: HOSPADM

## 2021-08-13 RX ORDER — CLOPIDOGREL BISULFATE 75 MG/1
75 TABLET ORAL DAILY
Status: DISCONTINUED | OUTPATIENT
Start: 2021-08-14 | End: 2021-08-14 | Stop reason: HOSPADM

## 2021-08-13 RX ORDER — LIDOCAINE HYDROCHLORIDE 20 MG/ML
INJECTION, SOLUTION INFILTRATION; PERINEURAL AS NEEDED
Status: DISCONTINUED | OUTPATIENT
Start: 2021-08-13 | End: 2021-08-13 | Stop reason: HOSPADM

## 2021-08-13 RX ADMIN — ATORVASTATIN CALCIUM 80 MG: 40 TABLET, FILM COATED ORAL at 21:19

## 2021-08-13 RX ADMIN — CARVEDILOL 25 MG: 25 TABLET, FILM COATED ORAL at 21:19

## 2021-08-13 RX ADMIN — RANOLAZINE 1000 MG: 500 TABLET, FILM COATED, EXTENDED RELEASE ORAL at 21:17

## 2021-08-13 RX ADMIN — OXYCODONE 5 MG: 5 TABLET ORAL at 21:19

## 2021-08-13 RX ADMIN — ISOSORBIDE MONONITRATE 120 MG: 60 TABLET, EXTENDED RELEASE ORAL at 21:18

## 2021-08-13 RX ADMIN — INSULIN GLARGINE 55 UNITS: 100 INJECTION, SOLUTION SUBCUTANEOUS at 21:15

## 2021-08-14 VITALS
SYSTOLIC BLOOD PRESSURE: 120 MMHG | TEMPERATURE: 97.7 F | RESPIRATION RATE: 16 BRPM | WEIGHT: 259.48 LBS | HEART RATE: 65 BPM | OXYGEN SATURATION: 95 % | BODY MASS INDEX: 37.15 KG/M2 | DIASTOLIC BLOOD PRESSURE: 68 MMHG | HEIGHT: 70 IN

## 2021-08-14 LAB
ANION GAP SERPL CALCULATED.3IONS-SCNC: 10 MMOL/L (ref 5–15)
BUN SERPL-MCNC: 12 MG/DL (ref 6–20)
BUN/CREAT SERPL: 12.4 (ref 7–25)
CALCIUM SPEC-SCNC: 8.7 MG/DL (ref 8.6–10.5)
CHLORIDE SERPL-SCNC: 101 MMOL/L (ref 98–107)
CHOLEST SERPL-MCNC: 103 MG/DL (ref 0–200)
CO2 SERPL-SCNC: 29 MMOL/L (ref 22–29)
CREAT SERPL-MCNC: 0.97 MG/DL (ref 0.76–1.27)
DEPRECATED RDW RBC AUTO: 46.8 FL (ref 37–54)
ERYTHROCYTE [DISTWIDTH] IN BLOOD BY AUTOMATED COUNT: 15.1 % (ref 12.3–15.4)
GFR SERPL CREATININE-BSD FRML MDRD: 80 ML/MIN/1.73
GLUCOSE BLDC GLUCOMTR-MCNC: 124 MG/DL (ref 70–105)
GLUCOSE BLDC GLUCOMTR-MCNC: 88 MG/DL (ref 70–105)
GLUCOSE SERPL-MCNC: 70 MG/DL (ref 65–99)
HCT VFR BLD AUTO: 36 % (ref 37.5–51)
HDLC SERPL-MCNC: 34 MG/DL (ref 40–60)
HGB BLD-MCNC: 12.2 G/DL (ref 13–17.7)
LDLC SERPL CALC-MCNC: 45 MG/DL (ref 0–100)
LDLC/HDLC SERPL: 1.25 {RATIO}
MCH RBC QN AUTO: 29.8 PG (ref 26.6–33)
MCHC RBC AUTO-ENTMCNC: 33.9 G/DL (ref 31.5–35.7)
MCV RBC AUTO: 87.9 FL (ref 79–97)
PLATELET # BLD AUTO: 157 10*3/MM3 (ref 140–450)
PMV BLD AUTO: 8.5 FL (ref 6–12)
POTASSIUM SERPL-SCNC: 3.9 MMOL/L (ref 3.5–5.2)
RBC # BLD AUTO: 4.09 10*6/MM3 (ref 4.14–5.8)
SODIUM SERPL-SCNC: 140 MMOL/L (ref 136–145)
TRIGL SERPL-MCNC: 133 MG/DL (ref 0–150)
VLDLC SERPL-MCNC: 24 MG/DL (ref 5–40)
WBC # BLD AUTO: 4.9 10*3/MM3 (ref 3.4–10.8)

## 2021-08-14 PROCEDURE — G0378 HOSPITAL OBSERVATION PER HR: HCPCS

## 2021-08-14 PROCEDURE — 93005 ELECTROCARDIOGRAM TRACING: CPT | Performed by: INTERNAL MEDICINE

## 2021-08-14 PROCEDURE — 80048 BASIC METABOLIC PNL TOTAL CA: CPT | Performed by: INTERNAL MEDICINE

## 2021-08-14 PROCEDURE — 80061 LIPID PANEL: CPT | Performed by: INTERNAL MEDICINE

## 2021-08-14 PROCEDURE — 93010 ELECTROCARDIOGRAM REPORT: CPT | Performed by: INTERNAL MEDICINE

## 2021-08-14 PROCEDURE — 85027 COMPLETE CBC AUTOMATED: CPT | Performed by: INTERNAL MEDICINE

## 2021-08-14 PROCEDURE — 83036 HEMOGLOBIN GLYCOSYLATED A1C: CPT | Performed by: INTERNAL MEDICINE

## 2021-08-14 PROCEDURE — 99217 PR OBSERVATION CARE DISCHARGE MANAGEMENT: CPT | Performed by: NURSE PRACTITIONER

## 2021-08-14 PROCEDURE — 82962 GLUCOSE BLOOD TEST: CPT

## 2021-08-14 RX ADMIN — ISOSORBIDE MONONITRATE 120 MG: 60 TABLET, EXTENDED RELEASE ORAL at 09:38

## 2021-08-14 RX ADMIN — ASPIRIN 81 MG: 81 TABLET, COATED ORAL at 09:42

## 2021-08-14 RX ADMIN — ESCITALOPRAM OXALATE 10 MG: 10 TABLET ORAL at 09:38

## 2021-08-14 RX ADMIN — EMPAGLIFLOZIN 10 MG: 10 TABLET, FILM COATED ORAL at 09:38

## 2021-08-14 RX ADMIN — RANOLAZINE 1000 MG: 500 TABLET, FILM COATED, EXTENDED RELEASE ORAL at 09:38

## 2021-08-14 RX ADMIN — CARVEDILOL 25 MG: 25 TABLET, FILM COATED ORAL at 09:38

## 2021-08-14 RX ADMIN — CLOPIDOGREL BISULFATE 75 MG: 75 TABLET ORAL at 09:38

## 2021-08-14 RX ADMIN — PANTOPRAZOLE SODIUM 40 MG: 40 TABLET, DELAYED RELEASE ORAL at 09:38

## 2021-08-14 NOTE — PLAN OF CARE
Goal Outcome Evaluation:  Plan of Care Reviewed With: patient        Progress: improving  Outcome Summary: Patient came on the  floor SP cardiac cath, site is soft and no bleeding noted. Will continue to monitor.

## 2021-08-14 NOTE — DISCHARGE SUMMARY
[facility]    DISCHARGE SUMMARY      Patient Name: Uday aJy  :1965  56 y.o.    Date of Admit: 2021  Date of Discharge:  2021    Discharge Diagnosis:  Problems Addressed this Visit        Cardiac and Vasculature    * (Principal) Coronary artery disease involving native coronary artery of native heart with angina pectoris (CMS/HCC)    Relevant Orders    Cardiac Catheterization/Vascular Study (Completed)      Diagnoses       Codes Comments    Coronary artery disease involving native coronary artery of native heart with angina pectoris (CMS/HCC)     ICD-10-CM: I25.119  ICD-9-CM: 414.01, 413.9       Coronary artery disease status post four-vessel CABG 2004     Attrition of saphenous vein graft to circumflex system noted catheterization 2020     Severe native vessel small caliber coronary arterial disease noted catheterization 2020  - s/p [facility] 2021: Successful PCI JIMENA SVG-DG with a 2.0 x 12 resolute [place]  Wearable cardiac defibrillator  Hypertension  Hypertensive cardiovascular disease  Hyperlipidemia  Diabetes mellitus  Palpitations  Obstructive sleep apnea  Chronic pain  Antiplatelet therapy       Hospital Course:   Patient underwent cardiac cath 2021. He underwent Successful PCI JIMENA SVG-DG with a 2.0 x 12 resolute [place] (see full report below). Patient is maintained on DAPT with ASA / Plavix. He did well post procedure without complications. Groin site stable. labwork reviewed. He will discharge today and follow up with Dr. Vogel in 2-4 weeks. Post pci discharge instructions discussed with patient at length.     Procedures Performed  Procedure(s):  LEFT HEART CATH with possible PCI  Saphenous Vein Graft  Percutaneous Coronary Intervention  Stent JIMENA bypass graft    Procedure Details:  The risks, benefits, complications, treatment options, and expected outcomes were discussed with the patient. The patient and/or family concurred with the  proposed plan, giving informed consent.      After informed consent the patient was brought to the cath lab after appropriate IV hydration was begun and oral premedication was given. He was further sedated with fentanyl and midazolam. He was prepped and draped in the usual manner. Using the modified Seldinger access technique, a 6 Tajik sheath was placed in the femoral artery. A left heart catheterization with coronary arteriography was performed. Findings are discussed below.     The decision was made to proceed with intervention. He received Heparin as per protocol. The details of the intervention are as follows:     A 6 Tajik LCB guiding catheter was placed into the ostium of the saphenous vein graft to the diagonal branch.  A 0.014 x 180 Teliportme PTCA wire was advanced into the distal diagonal branch.  A 2.0 x 8 trek semicompliant balloon was used to predilate the lesion.  The lesion was then treated with a 2.0 x 12 Robbi drug-eluting stent.  Post intervention angiography x2 demonstrated an excellent result with JONATHAN-3 flow and no evidence of thrombus or dissection.     After the procedure was completed, sedation was stopped and the sheaths and catheters were all removed according to established hospital protocols.     Conscious sedation:  Conscious sedation was performed according to protocol.  I supervised and directed an independent trained observer with the assistance of monitoring the patient's level of consciousness and physiologic status throughout the procedure.  Intraoperative service time was 57 minutes.     Findings:     Hemodynamics  LVEDP: 9  LV: 100/0  Ao: 95/59   Left Main  99% distal   RCA  100% mid  Faint right to right collaterals   LAD  100% proximal   Circ  100% proximal  Left to left and right to left collaterals   SVG(s)  SVG-RCA: Patent  SVG-D% at anastomosis   CURLY  LIMA-LAD: Patent  100% LAD distal to the anastomotic site with left to left collaterals    LV  inferior wall  hypokinesis overall left ventricular ejection fraction estimated at 45%   Coronary dominance  RCA         Interventions/Vessels  PCI JIMENA SVG-DG   Guides/Wires  6 Romanian LCB guide  [place]   Devices  2.0 x 8 trek  2.0 x 12 resolute [place]   Post % Stenosis  0   Pre-procedure JONATHAN flow  3   Post procedure JONATHAN flow  3   Closure Device  not applicable   Complications  none immediate      Estimated Blood Loss:  Minimal     Specimens: None         Complications:  None; patient tolerated the procedure well.           Disposition: PACU - hemodynamically stable           Condition: stable     Impressions:  Severe native vessel coronary arterial disease status post CABG  Attrition of saphenous vein graft to circumflex-known  Patent SVG-RCA  Patent LIMA-LAD  Successful PCI JIMENA SVG-DG with a 2.0 x 12 resolute [place]     Recommendations:  Dual antiplatelet therapy indefinitely  Risk factor modification  EECP as outpatient      Consults     No orders found for last 30 day(s).          Pertinent Test Results:   Results from last 7 days   Lab Units 08/14/21  0250   SODIUM mmol/L 140   POTASSIUM mmol/L 3.9   CHLORIDE mmol/L 101   CO2 mmol/L 29.0   BUN mg/dL 12   CREATININE mg/dL 0.97   CALCIUM mg/dL 8.7   GLUCOSE mg/dL 70         @LABRCNT(bnp)@  Results from last 7 days   Lab Units 08/14/21  0250   WBC 10*3/mm3 4.90   HEMOGLOBIN g/dL 12.2*   HEMATOCRIT % 36.0*   PLATELETS 10*3/mm3 157     Results from last 7 days   Lab Units 08/13/21  0819   INR  0.99         Results from last 7 days   Lab Units 08/14/21  0250   CHOLESTEROL mg/dL 103   TRIGLYCERIDES mg/dL 133   HDL CHOL mg/dL 34*   LDL CHOL mg/dL 45       ECHOCARDIOGRAM:    Results for orders placed in visit on 06/26/19    SCANNED - ECHOCARDIOGRAM        Condition on Discharge: stable    Physical Exam:  General Appearance:    Alert, cooperative, in no acute distress               Neck:   supple,no JVD   Lungs:     Clear to auscultation,respirations regular, even and                   "unlabored    Heart:    Regular rhythm and normal rate, normal S1 and S2   Abdomen:     Normal bowel sounds, soft   Extremities:   Moves all extremities well, no edema, no cyanosis, no             Redness right groin soft without oozing bruising or hematoma   Pulses:   Pulses palpable and equal bilaterally   Skin:   No bleeding, bruising or rash   Neurologic:   Awake, alert, oriented x3       Discharge Medications     Discharge Medications      Continue These Medications      Instructions Start Date   Alogliptin Benzoate 25 MG tablet   1 tablet, Oral, Daily      aspirin 81 MG EC tablet   325 mg, Oral, Daily      atorvastatin 80 MG tablet  Commonly known as: LIPITOR   80 mg, Oral, Daily      BD Insulin Syringe U/F 31G X 5/16\" 1 ML misc  Generic drug: Insulin Syringe-Needle U-100   No dose, route, or frequency recorded.      carvedilol 25 MG tablet  Commonly known as: COREG   25 mg, Oral, 2 Times Daily With Meals      clopidogrel 75 MG tablet  Commonly known as: PLAVIX   75 mg, Oral, Daily      escitalopram 10 MG tablet  Commonly known as: LEXAPRO   10 mg, Oral, Daily      ezetimibe 10 MG tablet  Commonly known as: ZETIA   10 mg, Oral, Daily      insulin glargine 100 UNIT/ML injection  Commonly known as: LANTUS, SEMGLEE   55 Units, Subcutaneous, Nightly, Took 25 units 8/12/2021.      isosorbide mononitrate 120 MG 24 hr tablet  Commonly known as: IMDUR   120 mg, Oral, 2 Times Daily      Jardiance 10 MG tablet tablet  Generic drug: empagliflozin   10 mg, Oral, Daily      metFORMIN 1000 MG tablet  Commonly known as: GLUCOPHAGE   1,000 mg, Oral, 2 Times Daily      nitroglycerin 0.4 MG SL tablet  Commonly known as: NITROSTAT   0.4 mg, Sublingual, Every 5 Minutes PRN, do not exceed a total of 3 doses in 15 minutes      Ozempic (1 MG/DOSE) 2 MG/1.5ML solution pen-injector  Generic drug: Semaglutide (1 MG/DOSE)   1 mg, Subcutaneous, Weekly      pantoprazole 40 MG EC tablet  Commonly known as: PROTONIX   40 mg, Oral, Daily    "   ranolazine 1000 MG 12 hr tablet  Commonly known as: RANEXA   1 tablet, Oral, 2 Times Daily      Repatha solution prefilled syringe injection  Generic drug: Evolocumab   1 mL, Subcutaneous, Every 14 Days             Discharge Diet:   Diet Instructions     Diet: Cardiac      Discharge Diet: Cardiac          Activity at Discharge:   Activity Instructions     Discharge Activity      Rest and relax today  no lifting over 10 lbs x 1 week  no driving for 24 hrs  no tub baths or hot tubs for 1 week  no stairs for 3 days  monitor right groin for s/s of bleeding          Discharge disposition: home    Follow-up Appointments  Future Appointments   Date Time Provider Department Center   10/15/2021  8:45 AM Adolfo Vogel, DO [facility]         Test Results Pending at Discharge  Pending Labs     Order Current Status    Hemoglobin A1c In process           [facility]    08/14/21  08:48 EDT    Time: > 30 minutes spent on discharge explaining discharge medications, instructions, activity / diet instructions / restrictions, counseling on disease processes, and follow up care / appointments.

## 2021-08-16 LAB — HBA1C MFR BLD: 6.5 % (ref 3.5–5.6)

## 2021-08-16 NOTE — CASE MANAGEMENT/SOCIAL WORK
Case Management Discharge Note      Final Note: home         Selected Continued Care - Discharged on 8/14/2021 Admission date: 8/13/2021 - Discharge disposition: Home or Self Care              Final Discharge Disposition Code: 01 - home or self-care

## 2021-09-19 LAB — QT INTERVAL: 463 MS

## 2021-10-28 ENCOUNTER — OFFICE VISIT (OUTPATIENT)
Dept: CARDIOLOGY | Facility: CLINIC | Age: 56
End: 2021-10-28

## 2021-10-28 VITALS
BODY MASS INDEX: 36.79 KG/M2 | OXYGEN SATURATION: 98 % | WEIGHT: 257 LBS | HEIGHT: 70 IN | DIASTOLIC BLOOD PRESSURE: 86 MMHG | SYSTOLIC BLOOD PRESSURE: 130 MMHG | HEART RATE: 69 BPM

## 2021-10-28 DIAGNOSIS — E78.2 MIXED HYPERLIPIDEMIA: ICD-10-CM

## 2021-10-28 DIAGNOSIS — I10 PRIMARY HYPERTENSION: ICD-10-CM

## 2021-10-28 DIAGNOSIS — I25.119 CORONARY ARTERY DISEASE INVOLVING NATIVE CORONARY ARTERY OF NATIVE HEART WITH ANGINA PECTORIS (HCC): Primary | ICD-10-CM

## 2021-10-28 PROCEDURE — 99214 OFFICE O/P EST MOD 30 MIN: CPT | Performed by: INTERNAL MEDICINE

## 2021-10-28 RX ORDER — LOSARTAN POTASSIUM 25 MG/1
TABLET ORAL
COMMUNITY
Start: 2021-08-24 | End: 2022-04-28 | Stop reason: ALTCHOICE

## 2021-10-28 NOTE — PROGRESS NOTES
Cardiology Office Visit      Encounter Date:  10/28/2021    Patient ID:   Uday Jay is a 56 y.o. male.    Reason For Followup:  Coronary artery disease    Brief Clinical History:  Dear Jeana Robert APRN    I had the pleasure of seeing Uday Jay today. As you are well aware, this is a 56 y.o. male with a history of ischemic heart disease.  He is undergone four-vessel coronary arterial bypass grafting in November 2004.  He has additional history that includes hypertension, hypertensive cardiovascular disease, hyperlipidemia, diabetes mellitus, obstructive sleep apnea, chronic back pain, palpitations, and antiplatelet therapy.  He presents today for follow-up on the above conditions.    Interval History:  He does report some chest discomfort with exertion from time to time.  He reports some shortness of breath but this is better than previous visits.  He denies any PND orthopnea.  He denies any syncope or near syncope.    As I am sure you are aware, he underwent cardiac catheterization in August 2021.  He had occlusion that was known of his saphenous vein graft to his circumflex vessel.  In contrast to his 2020 catheterization, his vein graft to diagonal demonstrated severe/subtotal disease.  This was treated with a small drug-eluting stent.  Since this was done, the patient reports about an 80% improvement in his symptoms.  The remainder of his catheterization findings were similar to the 2020 films.     His previous catheterization from October 2020 demonstrated quite disappointing findings. He has severe native vessel coronary artery disease with complete occlusion of his LAD, circumflex, and RCA origins.  He has undergone four-vessel CABG in the past.  The vein graft to the circumflex has suffered attrition but the graft to the diagonal, RCA, and LAD remain.  The difficulty lies in the caliber of his native vasculature.  The arteries distal to the anastomoses are diffusely diseased  "and very small caliber.  His LAD has a total occlusion just beyond the anastomosis with reconstitution of the vessel via left to left collaterals.  The distal RCA and diagonal branches suffer from similar small caliber.    We reviewed his cardiac catheterization and PCI from August 2021 today together.    We discussed the potential for EECP again.  We do currently have staffing in order to move forward with this if he is willing to do so.  He is not interested in cardiac rehab at the present time.    Additionally he reports that he had been getting some ablative therapy for pain.  His pain management doctor had asked about discontinuing his antiplatelet therapy.  I told him that this is not negotiable.  He must remain on antiplatelet therapy at a bare minimum of 1 year post PCI.    His most recent laboratory data from your office dated 7/14/2021.  His lipid profile is within acceptable limits.  Total cholesterol was 126 LDL-C was 63 and HDL was 41.  Renal function panel was within normal limits with the exception of a slightly elevated total bilirubin.    Assessment & Plan    Impressions:  Coronary artery disease status post four-vessel CABG November 2004     Attrition of saphenous vein graft to circumflex system noted catheterization October 2020     Severe native vessel small caliber coronary arterial disease noted catheterization October 2020  Wearable cardiac defibrillator  Hypertension  Hypertensive cardiovascular disease  Hyperlipidemia  Diabetes mellitus  Palpitations  Obstructive sleep apnea  Chronic pain  Antiplatelet therapy    Recommendations:  Continuation of his current cardiovascular regimen at the present time.  Follow-up in 6 months time sooner should there be difficulties.    Objective:    Vitals:  Vitals:    10/28/21 1126   BP: 130/86   BP Location: Left arm   Patient Position: Sitting   Cuff Size: Large Adult   Pulse: 69   SpO2: 98%   Weight: 117 kg (257 lb)   Height: 177.8 cm (70\")       Physical " "Exam:    General: Alert, cooperative, no distress, appears stated age  Head:  Normocephalic, atraumatic, mucous membranes moist  Eyes:  Conjunctiva/corneas clear, EOM's intact     Neck:  Supple,  no bruit  Lungs: Clear to auscultation bilaterally, no wheezes rhonchi rales are noted  Chest wall: No tenderness  Heart::  Regular rate and rhythm, S1 and S2 normal, 1/6 holosystolic murmur.  No rub or gallop  Abdomen: Soft, non-tender, nondistended bowel sounds active.  Obese  Extremities: No cyanosis, clubbing, or edema  Pulses: 2+ and symmetric all extremities  Skin:  No rashes or lesions  Neuro/psych: A&O x3. CN II through XII are grossly intact with appropriate affect      Allergies:  No Known Allergies    Medication Review:     Current Outpatient Medications:   •  Alogliptin Benzoate 25 MG tablet, Take 1 tablet by mouth Daily., Disp: , Rfl:   •  aspirin  MG tablet, Take 325 mg by mouth Daily., Disp: , Rfl:   •  atorvastatin (LIPITOR) 80 MG tablet, Take 80 mg by mouth Daily., Disp: , Rfl:   •  BD Insulin Syringe U/F 31G X 5/16\" 1 ML misc, , Disp: , Rfl:   •  carvedilol (COREG) 25 MG tablet, Take 25 mg by mouth 2 (Two) Times a Day With Meals., Disp: , Rfl: 3  •  clopidogrel (PLAVIX) 75 MG tablet, Take 75 mg by mouth Daily., Disp: , Rfl:   •  Empagliflozin (JARDIANCE) 10 MG tablet, Take 10 mg by mouth Daily., Disp: , Rfl:   •  escitalopram (LEXAPRO) 10 MG tablet, Take 10 mg by mouth Daily., Disp: , Rfl: 2  •  Evolocumab (REPATHA) 140 MG/ML solution prefilled syringe, Inject 1 mL under the skin into the appropriate area as directed Every 14 (Fourteen) Days., Disp: , Rfl:   •  insulin glargine (LANTUS) 100 UNIT/ML injection, Inject 55 Units under the skin into the appropriate area as directed Every Night. Took 25 units 8/12/2021., Disp: , Rfl:   •  isosorbide mononitrate (IMDUR) 120 MG 24 hr tablet, Take 120 mg by mouth 2 (Two) Times a Day., Disp: , Rfl:   •  losartan (COZAAR) 25 MG tablet, , Disp: , Rfl:   •  " metFORMIN (GLUCOPHAGE) 1000 MG tablet, Take 1,000 mg by mouth 2 (Two) Times a Day., Disp: , Rfl: 1  •  nitroglycerin (NITROSTAT) 0.4 MG SL tablet, Place 0.4 mg under the tongue Every 5 (Five) Minutes As Needed. do not exceed a total of 3 doses in 15 minutes, Disp: , Rfl:   •  Ozempic, 1 MG/DOSE, 2 MG/1.5ML solution pen-injector, Inject 1 mg under the skin into the appropriate area as directed 1 (One) Time Per Week., Disp: , Rfl:   •  pantoprazole (PROTONIX) 40 MG EC tablet, Take 40 mg by mouth Daily., Disp: , Rfl:   •  ranolazine (RANEXA) 1000 MG 12 hr tablet, Take 1 tablet by mouth 2 (Two) Times a Day., Disp: , Rfl:   •  ezetimibe (ZETIA) 10 MG tablet, Take 10 mg by mouth Daily., Disp: , Rfl:     Family History:  Family History   Problem Relation Age of Onset   • Heart disease Mother        Past Medical History:  Past Medical History:   Diagnosis Date   • Coronary artery disease    • Diabetes (HCC)    • GERD (gastroesophageal reflux disease)    • Hyperlipidemia    • Hypertension    • Low back pain    • Sleep apnea        Past surgical History:  Past Surgical History:   Procedure Laterality Date   • CARDIAC CATHETERIZATION     • CARDIAC CATHETERIZATION Left 10/23/2020    Procedure: Cardiac Catheterization/Vascular Study;  Surgeon: Adolfo Vogel DO;  Location: [facility] CATH INVASIVE LOCATION;  Service: Cardiology;  Laterality: Left;   • CARDIAC CATHETERIZATION N/A 8/13/2021    Procedure: LEFT HEART CATH with possible PCI;  Surgeon: Adolfo Vogel DO;  Location: [facility] CATH INVASIVE LOCATION;  Service: Cardiology;  Laterality: N/A;  Local and IV sedation   • CARDIAC CATHETERIZATION  8/13/2021    Procedure: Saphenous Vein Graft;  Surgeon: Adolfo Vogel DO;  Location: [facility] CATH INVASIVE LOCATION;  Service: Cardiology;;   SVG X 3   LIMA   • CARDIAC CATHETERIZATION N/A 8/13/2021    Procedure: Percutaneous Coronary Intervention;  Surgeon: Adolfo Vogel DO;   Location: [facility] CATH INVASIVE LOCATION;  Service: Cardiology;  Laterality: N/A;   SVG DIAG   • CARDIAC CATHETERIZATION N/A 2021    Procedure: Stent JIMENA bypass graft;  Surgeon: Adolfo Vogel DO;  Location: [facility] CATH INVASIVE LOCATION;  Service: Cardiology;  Laterality: N/A;   SVG DIAG ANASTOMOSIS SITE   • CARDIAC SURGERY     • CAROTID STENT     • CORONARY ARTERY BYPASS GRAFT     • KNEE ARTHROPLASTY, PARTIAL REPLACEMENT     • PENILE PROSTHESIS IMPLANT     • SPINAL CORD STIMULATOR IMPLANT     • SPINAL CORD STIMULATOR IMPLANT N/A 2019    Procedure: T10 laminectomy and placement of spinal cord stimulator;  Surgeon: Gianluca Miles MD;  Location: [facility] MAIN OR;  Service: Neurosurgery       Social History:  Social History     Socioeconomic History   • Marital status:    Tobacco Use   • Smoking status: Former Smoker     Types: Cigarettes     Quit date:      Years since quittin.8   • Smokeless tobacco: Never Used   Vaping Use   • Vaping Use: Never used   Substance and Sexual Activity   • Alcohol use: Yes     Comment: occ   • Drug use: No       Review of Systems:  The following systems were reviewed as they relate to the cardiovascular system: Constitutional, Eyes, ENT, Cardiovascular, Respiratory, Gastrointestinal, Integumentary, Neurological, Psychiatric, Hematologic, Endocrine, Musculoskeletal, and Genitourinary. The pertinent cardiovascular findings are reported above with all other cardiovascular points within those systems being negative.    Diagnostic Study Review:     Current Electrocardiogram:  Procedures no new EKG.  EKG dated 2021 demonstrates sinus rhythm with a ventricular rate of 69 bpm.      NOTE: The following portions of the patient's note were reviewed, confirmed and/or updated this visit as appropriate: History of present illness/Interval history, physical examination, assessment & plan, allergies, current medications, past family history, past  medical history, past social history, past surgical history and problem list.

## 2022-01-24 PROBLEM — U07.1 CLINICAL DIAGNOSIS OF SEVERE ACUTE RESPIRATORY SYNDROME CORONAVIRUS 2 (SARS-COV-2) DISEASE: Status: ACTIVE | Noted: 2022-01-24

## 2022-01-24 RX ORDER — DIPHENHYDRAMINE HCL 25 MG
50 TABLET ORAL ONCE AS NEEDED
Status: CANCELLED | OUTPATIENT
Start: 2022-01-25

## 2022-01-24 RX ORDER — SODIUM CHLORIDE 9 MG/ML
30 INJECTION, SOLUTION INTRAVENOUS ONCE
Status: CANCELLED | OUTPATIENT
Start: 2022-01-25

## 2022-01-24 RX ORDER — EPINEPHRINE 1 MG/ML
0.3 INJECTION, SOLUTION INTRAMUSCULAR; SUBCUTANEOUS AS NEEDED
Status: CANCELLED | OUTPATIENT
Start: 2022-01-25

## 2022-01-24 RX ORDER — METHYLPREDNISOLONE SODIUM SUCCINATE 125 MG/2ML
125 INJECTION, POWDER, LYOPHILIZED, FOR SOLUTION INTRAMUSCULAR; INTRAVENOUS AS NEEDED
Status: CANCELLED | OUTPATIENT
Start: 2022-01-25

## 2022-01-24 RX ORDER — DIPHENHYDRAMINE HYDROCHLORIDE 50 MG/ML
50 INJECTION INTRAMUSCULAR; INTRAVENOUS ONCE AS NEEDED
Status: CANCELLED | OUTPATIENT
Start: 2022-01-25

## 2022-01-25 ENCOUNTER — HOSPITAL ENCOUNTER (OUTPATIENT)
Dept: INFUSION THERAPY | Facility: HOSPITAL | Age: 57
Discharge: HOME | End: 2022-01-25
Admitting: NURSE PRACTITIONER

## 2022-01-25 VITALS
TEMPERATURE: 98.3 F | DIASTOLIC BLOOD PRESSURE: 80 MMHG | SYSTOLIC BLOOD PRESSURE: 137 MMHG | RESPIRATION RATE: 14 BRPM | HEART RATE: 68 BPM | OXYGEN SATURATION: 97 %

## 2022-01-25 DIAGNOSIS — U07.1 CLINICAL DIAGNOSIS OF SEVERE ACUTE RESPIRATORY SYNDROME CORONAVIRUS 2 (SARS-COV-2) DISEASE: Primary | ICD-10-CM

## 2022-01-25 PROCEDURE — 96365 THER/PROPH/DIAG IV INF INIT: CPT

## 2022-01-25 PROCEDURE — M0247 HC INTRAVENOUS INFUSION SOTROVIMAB: HCPCS | Performed by: NURSE PRACTITIONER

## 2022-01-25 PROCEDURE — 25010000002 SOTROVIMAB 500 MG/8ML SOLUTION: Performed by: NURSE PRACTITIONER

## 2022-01-25 PROCEDURE — 96361 HYDRATE IV INFUSION ADD-ON: CPT

## 2022-01-25 RX ORDER — METHYLPREDNISOLONE SODIUM SUCCINATE 125 MG/2ML
125 INJECTION, POWDER, LYOPHILIZED, FOR SOLUTION INTRAMUSCULAR; INTRAVENOUS AS NEEDED
OUTPATIENT
Start: 2022-01-25

## 2022-01-25 RX ORDER — SODIUM CHLORIDE 9 MG/ML
30 INJECTION, SOLUTION INTRAVENOUS ONCE
Status: CANCELLED | OUTPATIENT
Start: 2022-01-25

## 2022-01-25 RX ORDER — DIPHENHYDRAMINE HCL 25 MG
50 CAPSULE ORAL ONCE AS NEEDED
OUTPATIENT
Start: 2022-01-25

## 2022-01-25 RX ORDER — SODIUM CHLORIDE 9 MG/ML
30 INJECTION, SOLUTION INTRAVENOUS ONCE
Status: COMPLETED | OUTPATIENT
Start: 2022-01-25 | End: 2022-01-25

## 2022-01-25 RX ORDER — DIPHENHYDRAMINE HYDROCHLORIDE 50 MG/ML
50 INJECTION INTRAMUSCULAR; INTRAVENOUS ONCE AS NEEDED
OUTPATIENT
Start: 2022-01-25

## 2022-01-25 RX ORDER — EPINEPHRINE 1 MG/ML
0.3 INJECTION, SOLUTION, CONCENTRATE INTRAVENOUS AS NEEDED
OUTPATIENT
Start: 2022-01-25

## 2022-01-25 RX ADMIN — SODIUM CHLORIDE 500 MG: 9 INJECTION, SOLUTION INTRAVENOUS at 08:27

## 2022-01-25 RX ADMIN — SODIUM CHLORIDE 30 ML/HR: 9 INJECTION, SOLUTION INTRAVENOUS at 08:27

## 2022-04-28 ENCOUNTER — OFFICE VISIT (OUTPATIENT)
Dept: CARDIOLOGY | Facility: CLINIC | Age: 57
End: 2022-04-28

## 2022-04-28 VITALS
HEART RATE: 61 BPM | HEIGHT: 70 IN | SYSTOLIC BLOOD PRESSURE: 148 MMHG | BODY MASS INDEX: 34.22 KG/M2 | DIASTOLIC BLOOD PRESSURE: 82 MMHG | WEIGHT: 239 LBS | OXYGEN SATURATION: 98 %

## 2022-04-28 DIAGNOSIS — I10 PRIMARY HYPERTENSION: ICD-10-CM

## 2022-04-28 DIAGNOSIS — Z95.1 S/P CABG (CORONARY ARTERY BYPASS GRAFT): ICD-10-CM

## 2022-04-28 DIAGNOSIS — I25.119 CORONARY ARTERY DISEASE INVOLVING NATIVE CORONARY ARTERY OF NATIVE HEART WITH ANGINA PECTORIS: Primary | ICD-10-CM

## 2022-04-28 DIAGNOSIS — E78.2 MIXED HYPERLIPIDEMIA: ICD-10-CM

## 2022-04-28 PROCEDURE — 99214 OFFICE O/P EST MOD 30 MIN: CPT | Performed by: INTERNAL MEDICINE

## 2022-04-28 PROCEDURE — 93000 ELECTROCARDIOGRAM COMPLETE: CPT | Performed by: INTERNAL MEDICINE

## 2022-04-28 RX ORDER — ALIROCUMAB 75 MG/ML
INJECTION, SOLUTION SUBCUTANEOUS
COMMUNITY

## 2022-04-28 RX ORDER — EMPAGLIFLOZIN 25 MG/1
TABLET, FILM COATED ORAL
COMMUNITY
Start: 2022-04-25

## 2022-04-28 NOTE — PROGRESS NOTES
Cardiology Office Visit      Encounter Date:  04/28/2022    Patient ID:   Uday Jay is a 57 y.o. male.    Reason For Followup:  Coronary artery disease    Brief Clinical History:  Dear Jeana Robert APRN    I had the pleasure of seeing Uday Jay today. As you are well aware, this is a 57 y.o. male with a history of ischemic heart disease.  He is undergone four-vessel coronary arterial bypass grafting in November 2004.  He has additional history that includes hypertension, hypertensive cardiovascular disease, hyperlipidemia, diabetes mellitus, obstructive sleep apnea, chronic back pain, palpitations, and antiplatelet therapy.  He presents today for follow-up on the above conditions.    Interval History:  He does report some chest discomfort with exertion from time to time.  More specifically he reports that his chest discomfort is when he lays down at night.  This occurs about 2-3 times a month and is resolved with a single nitroglycerin.  He reports some shortness of breath but this is better than previous visits.  He denies any PND orthopnea.  He denies any syncope or near syncope.    As you will recall, he underwent cardiac catheterization in August 2021.  He had occlusion that was known of his saphenous vein graft to his circumflex vessel.  In contrast to his 2020 catheterization, his vein graft to diagonal demonstrated severe/subtotal disease.  This was treated with a small drug-eluting stent.  Since this was done, the patient reports about an 80% improvement in his symptoms.  The remainder of his catheterization findings were similar to the 2020 films.     His previous catheterization from October 2020 demonstrated quite disappointing findings. He has severe native vessel coronary artery disease with complete occlusion of his LAD, circumflex, and RCA origins.  He has undergone four-vessel CABG in the past.  The vein graft to the circumflex has suffered attrition but the graft to  the diagonal, RCA, and LAD remain.  The difficulty lies in the caliber of his native vasculature.  The arteries distal to the anastomoses are diffusely diseased and very small caliber.  His LAD has a total occlusion just beyond the anastomosis with reconstitution of the vessel via left to left collaterals.  The distal RCA and diagonal branches suffer from similar small caliber.    He ask about disability today.  Based on my assessment, his symptoms are only modest and do not appear to rise to the level of cardiac disability.    He reports that he is going to be undergoing eyelid surgery and ask if he could come off of his blood thinner a few weeks early.  I told him this was not advisable and that I would recommend him staying on this until at least 1 year post PCI.      His most recent laboratory data is from February 2022.  Hemoglobin A1c 5.2%.  BUN 14.5 creatinine 0.91.  Hepatic function panel is within normal limits.  Hemoglobin 12.6 hematocrit 37.1.  Cholesterol studies were not performed.    Assessment & Plan    Impressions:  Coronary artery disease status post four-vessel CABG November 2004     Attrition of saphenous vein graft to circumflex system noted catheterization October 2020     Severe native vessel small caliber coronary arterial disease noted catheterization October 2020  Hypertension  Hypertensive cardiovascular disease  Hyperlipidemia  Diabetes mellitus  Palpitations  Obstructive sleep apnea  Chronic pain  Antiplatelet therapy    Recommendations:  Continuation of his current cardiovascular regimen at the present time.     This includes antihypertensives, lipid-lowering strategies, and antiplatelet therapy.  Follow-up in 6 months time sooner should there be difficulties.    Diagnoses and all orders for this visit:    1. Coronary artery disease involving native coronary artery of native heart with angina pectoris (HCC) (Primary)  -     Cancel: Cardiac Rehab Phase III; Future  -     ECG 12 Lead    2.  "Mixed hyperlipidemia  -     ECG 12 Lead    3. Primary hypertension  -     ECG 12 Lead    4. S/P CABG (coronary artery bypass graft)  -     ECG 12 Lead          Objective:    Vitals:  Vitals:    04/28/22 1107   BP: 148/82   Pulse: 61   SpO2: 98%   Weight: 108 kg (239 lb)   Height: 177.8 cm (70\")     Body mass index is 34.29 kg/m².      Physical Exam:    General: Alert, cooperative, no distress, appears stated age  Head:  Normocephalic, atraumatic, mucous membranes moist  Eyes:  Conjunctiva/corneas clear, EOM's intact     Neck:  Supple,  no bruit    Lungs: Clear to auscultation bilaterally, no wheezes rhonchi rales are noted  Chest wall: No tenderness  Heart::  Regular rate and rhythm, S1 and S2 normal, 1/6 holosystolic murmur.  No rub or gallop  Abdomen: Soft, non-tender, nondistended bowel sounds active.  Obese  Extremities: No cyanosis, clubbing, or edema  Pulses: 2+ and symmetric all extremities  Skin:  No rashes or lesions  Neuro/psych: A&O x3. CN II through XII are grossly intact with appropriate affect      Allergies:  No Known Allergies    Medication Review:     Current Outpatient Medications:   •  Alirocumab (Praluent) 75 MG/ML solution auto-injector, Inject  under the skin into the appropriate area as directed., Disp: , Rfl:   •  Alogliptin Benzoate 25 MG tablet, Take 1 tablet by mouth Daily., Disp: , Rfl:   •  aspirin  MG tablet, Take 325 mg by mouth Daily., Disp: , Rfl:   •  atorvastatin (LIPITOR) 80 MG tablet, Take 80 mg by mouth Daily., Disp: , Rfl:   •  carvedilol (COREG) 25 MG tablet, Take 25 mg by mouth 2 (Two) Times a Day With Meals., Disp: , Rfl: 3  •  clopidogrel (PLAVIX) 75 MG tablet, Take 75 mg by mouth Daily., Disp: , Rfl:   •  escitalopram (LEXAPRO) 10 MG tablet, Take 10 mg by mouth Daily., Disp: , Rfl: 2  •  ezetimibe (ZETIA) 10 MG tablet, Take 10 mg by mouth Daily., Disp: , Rfl:   •  insulin glargine (LANTUS) 100 UNIT/ML injection, Inject 55 Units under the skin into the appropriate " "area as directed Every Night. Took 25 units 8/12/2021., Disp: , Rfl:   •  isosorbide mononitrate (IMDUR) 120 MG 24 hr tablet, Take 120 mg by mouth 2 (Two) Times a Day., Disp: , Rfl:   •  Jardiance 25 MG tablet tablet, , Disp: , Rfl:   •  metFORMIN (GLUCOPHAGE) 1000 MG tablet, Take 1,000 mg by mouth Daily With Breakfast., Disp: , Rfl: 1  •  nitroglycerin (NITROSTAT) 0.4 MG SL tablet, Place 0.4 mg under the tongue Every 5 (Five) Minutes As Needed. do not exceed a total of 3 doses in 15 minutes, Disp: , Rfl:   •  Ozempic, 1 MG/DOSE, 2 MG/1.5ML solution pen-injector, Inject 1 mg under the skin into the appropriate area as directed 1 (One) Time Per Week., Disp: , Rfl:   •  pantoprazole (PROTONIX) 40 MG EC tablet, Take 40 mg by mouth Daily., Disp: , Rfl:   •  ranolazine (RANEXA) 1000 MG 12 hr tablet, Take 1 tablet by mouth 2 (Two) Times a Day., Disp: , Rfl:   •  BD Insulin Syringe U/F 31G X 5/16\" 1 ML misc, , Disp: , Rfl:     Family History:  Family History   Problem Relation Age of Onset   • Heart disease Mother    • Cancer Father        Past Medical History:  Past Medical History:   Diagnosis Date   • Clinical diagnosis of severe acute respiratory syndrome coronavirus 2 (SARS-CoV-2) disease 01/24/2022   • Coronary artery disease    • Diabetes (HCC)    • GERD (gastroesophageal reflux disease)    • Hyperlipidemia    • Hypertension    • Low back pain    • Myocardial infarction (HCC)    • Sleep apnea        Past Surgical History:  Past Surgical History:   Procedure Laterality Date   • CARDIAC CATHETERIZATION     • CARDIAC CATHETERIZATION Left 10/23/2020    Procedure: Cardiac Catheterization/Vascular Study;  Surgeon: Adolfo Vogel DO;  Location: [facility] CATH INVASIVE LOCATION;  Service: Cardiology;  Laterality: Left;   • CARDIAC CATHETERIZATION N/A 8/13/2021    Procedure: LEFT HEART CATH with possible PCI;  Surgeon: Adolfo Vogel DO;  Location:  [facility] CATH INVASIVE LOCATION;  Service: Cardiology; "  Laterality: N/A;  Local and IV sedation   • CARDIAC CATHETERIZATION  2021    Procedure: Saphenous Vein Graft;  Surgeon: Adolfo Vogel DO;  Location: [facility] CATH INVASIVE LOCATION;  Service: Cardiology;;   SVG X 3   LIMA   • CARDIAC CATHETERIZATION N/A 2021    Procedure: Percutaneous Coronary Intervention;  Surgeon: Adolfo Vogel DO;  Location: [facility] CATH INVASIVE LOCATION;  Service: Cardiology;  Laterality: N/A;   SVG DIAG   • CARDIAC CATHETERIZATION N/A 2021    Procedure: Stent JIMENA bypass graft;  Surgeon: Adolfo Vogel DO;  Location: [facility] CATH INVASIVE LOCATION;  Service: Cardiology;  Laterality: N/A;   SVG DIAG ANASTOMOSIS SITE   • CARDIAC SURGERY     • CAROTID STENT     • CORONARY ARTERY BYPASS GRAFT     • KNEE ARTHROPLASTY, PARTIAL REPLACEMENT     • PENILE PROSTHESIS IMPLANT     • SPINAL CORD STIMULATOR IMPLANT     • SPINAL CORD STIMULATOR IMPLANT N/A 2019    Procedure: T10 laminectomy and placement of spinal cord stimulator;  Surgeon: Gianluca Miles MD;  Location: [facility] MAIN OR;  Service: Neurosurgery       Social History:  Social History     Socioeconomic History   • Marital status:    Tobacco Use   • Smoking status: Former Smoker     Types: Cigarettes     Quit date:      Years since quittin.3   • Smokeless tobacco: Never Used   Vaping Use   • Vaping Use: Never used   Substance and Sexual Activity   • Alcohol use: Yes     Comment: occ   • Drug use: No   • Sexual activity: Defer       Review of Systems:  The following systems were reviewed as they relate to the cardiovascular system: Constitutional, Eyes, ENT, Cardiovascular, Respiratory, Gastrointestinal, Integumentary, Neurological, Psychiatric, Hematologic, Endocrine, Musculoskeletal, and Genitourinary. The pertinent cardiovascular findings are reported above with all other cardiovascular points within those systems being negative.    Diagnostic Study Review:      Current Electrocardiogram:    ECG 12 Lead    Date/Time: 4/28/2022 12:34 PM  Performed by: Adolfo Vogel DO  Authorized by: Adolfo Vogel DO   Comparison: not compared with previous ECG   Previous ECG: no previous ECG available  Comments: Normal sinus rhythm with a ventricular rate of 62 bpm.  Nonspecific interventricular conduction delay.  Prolonged QT and QTc intervals of 499 and 507 ms respectively.            Laboratory Data:  Lab Results   Component Value Date    GLUCOSE 70 08/14/2021    BUN 12 08/14/2021    CREATININE 0.97 08/14/2021    EGFRIFNONA 80 08/14/2021    BCR 12.4 08/14/2021    K 3.9 08/14/2021    CO2 29.0 08/14/2021    CALCIUM 8.7 08/14/2021    ALBUMIN 4.70 10/30/2019    AST 32 10/30/2019    ALT 28 10/30/2019     Lab Results   Component Value Date    GLUCOSE 70 08/14/2021    CALCIUM 8.7 08/14/2021     08/14/2021    K 3.9 08/14/2021    CO2 29.0 08/14/2021     08/14/2021    BUN 12 08/14/2021    CREATININE 0.97 08/14/2021    EGFRIFNONA 80 08/14/2021    BCR 12.4 08/14/2021    ANIONGAP 10.0 08/14/2021     Lab Results   Component Value Date    WBC 4.90 08/14/2021    HGB 12.2 (L) 08/14/2021    HCT 36.0 (L) 08/14/2021    MCV 87.9 08/14/2021     08/14/2021     Lab Results   Component Value Date    CHOL 103 08/14/2021    TRIG 133 08/14/2021    HDL 34 (L) 08/14/2021    LDL 45 08/14/2021     Lab Results   Component Value Date    HGBA1C 6.5 (H) 08/14/2021     Lab Results   Component Value Date    INR 0.99 08/13/2021    INR 0.95 10/21/2020    INR 0.94 10/30/2019    PROTIME 11.0 08/13/2021    PROTIME 10.5 10/21/2020    PROTIME 10.0 10/30/2019       Most Recent Echo:  Results for orders placed in visit on 06/26/19    SCANNED - ECHOCARDIOGRAM       Most Recent Stress Test:  Results for orders placed during the hospital encounter of 10/05/20    Nuclear Medicine Cardiac Blood Pool Gated Spect At Rest    Interpretation Summary  · Left ventricular ejection fraction at  rest was 56% with normal wall motion.       Most Recent Cardiac Catheterization:   Results for orders placed during the hospital encounter of 08/13/21    Cardiac Catheterization/Vascular Study    Narrative  Cardiac Catheterization with PCI Report    Uday Jay  [id number]  8/13/2021  @PCP@    He underwent cardiac catheterization with selective coronary angiography, left ventriculography, saphenous vein graft angiography, LIMA angiography, PCI JIMENA SVG-DG, post intervention angiography x2.    Indications for the procedure include: exertional angina.    Procedure Details:  The risks, benefits, complications, treatment options, and expected outcomes were discussed with the patient. The patient and/or family concurred with the proposed plan, giving informed consent.    After informed consent the patient was brought to the cath lab after appropriate IV hydration was begun and oral premedication was given. He was further sedated with fentanyl and midazolam. He was prepped and draped in the usual manner. Using the modified Seldinger access technique, a 6 British sheath was placed in the femoral artery. A left heart catheterization with coronary arteriography was performed. Findings are discussed below.    The decision was made to proceed with intervention. He received Heparin as per protocol. The details of the intervention are as follows:    A 6 British LCB guiding catheter was placed into the ostium of the saphenous vein graft to the diagonal branch.  A 0.014 x 180 Brainomix PTCA wire was advanced into the distal diagonal branch.  A 2.0 x 8 trek semicompliant balloon was used to predilate the lesion.  The lesion was then treated with a 2.0 x 12 Robbi drug-eluting stent.  Post intervention angiography x2 demonstrated an excellent result with JONATHAN-3 flow and no evidence of thrombus or dissection.    After the procedure was completed, sedation was stopped and the sheaths and catheters were all removed according to  established hospital protocols.    Conscious sedation:  Conscious sedation was performed according to protocol.  I supervised and directed an independent trained observer with the assistance of monitoring the patient's level of consciousness and physiologic status throughout the procedure.  Intraoperative service time was 57 minutes.    Findings:    Hemodynamics  LVEDP: 9  LV: 100/0  Ao: 95/59  Left Main  99% distal  RCA  100% mid  Faint right to right collaterals  LAD  100% proximal  Circ  100% proximal  Left to left and right to left collaterals  SVG(s)  SVG-RCA: Patent  SVG-D% at anastomosis  CURLY  LIMA-LAD: Patent  100% LAD distal to the anastomotic site with left to left collaterals  LV  inferior wall hypokinesis overall left ventricular ejection fraction estimated at 45%  Coronary dominance  RCA    Interventions/Vessels  PCI JIMENA SVG-DG  Guides/Wires  6 [place] LCB guide  [place]  Devices  2.0 x 8 trek  2.0 x 12 resolute [place]  Post % Stenosis  0  Pre-procedure JONATHAN flow  3  Post procedure JONATHAN flow  3  Closure Device  not applicable  Complications  none immediate    Estimated Blood Loss:  Minimal    Specimens: None    Complications:  None; patient tolerated the procedure well.    Disposition: PACU - hemodynamically stable    Condition: stable    Impressions:  Severe native vessel coronary arterial disease status post CABG  Attrition of saphenous vein graft to circumflex-known  Patent SVG-RCA  Patent LIMA-LAD  Successful PCI JIMENA SVG-DG with a 2.0 x 12 resolute Robbi    Recommendations:  Dual antiplatelet therapy indefinitely  Risk factor modification  EECP as outpatient       NOTE: The following portions of the patient's note were reviewed, confirmed and/or updated this visit as appropriate: History of present illness/Interval history, physical examination, assessment & plan, allergies, current medications, past family history, past medical history, past social history, past surgical history and problem  list.

## 2022-08-02 ENCOUNTER — TRANSCRIBE ORDERS (OUTPATIENT)
Dept: ADMINISTRATIVE | Facility: HOSPITAL | Age: 57
End: 2022-08-02

## 2022-08-02 DIAGNOSIS — I25.10 ARTERIOSCLEROTIC CARDIOVASCULAR DISEASE (ASCVD): Primary | ICD-10-CM

## 2022-08-25 ENCOUNTER — APPOINTMENT (OUTPATIENT)
Dept: CARDIOLOGY | Facility: HOSPITAL | Age: 57
End: 2022-08-25

## 2022-09-17 ENCOUNTER — HOSPITAL ENCOUNTER (OUTPATIENT)
Dept: CARDIOLOGY | Facility: HOSPITAL | Age: 57
Discharge: HOME | End: 2022-09-17
Admitting: NURSE PRACTITIONER

## 2022-09-17 DIAGNOSIS — I25.10 ARTERIOSCLEROTIC CARDIOVASCULAR DISEASE (ASCVD): ICD-10-CM

## 2022-09-17 PROCEDURE — 93306 TTE W/DOPPLER COMPLETE: CPT | Performed by: INTERNAL MEDICINE

## 2022-09-17 PROCEDURE — 93306 TTE W/DOPPLER COMPLETE: CPT

## 2022-09-21 LAB
BH CV ECHO MEAS - ACS: 1.93 CM
BH CV ECHO MEAS - AO MAX PG: 9 MMHG
BH CV ECHO MEAS - AO MEAN PG: 5.1 MMHG
BH CV ECHO MEAS - AO ROOT DIAM: 3.8 CM
BH CV ECHO MEAS - AO V2 MAX: 149.7 CM/SEC
BH CV ECHO MEAS - AO V2 VTI: 38.9 CM
BH CV ECHO MEAS - AVA(I,D): 2.5 CM2
BH CV ECHO MEAS - EDV(CUBED): 242.2 ML
BH CV ECHO MEAS - EDV(MOD-SP4): 133.9 ML
BH CV ECHO MEAS - EF(MOD-BP): 57 %
BH CV ECHO MEAS - EF(MOD-SP4): 57 %
BH CV ECHO MEAS - ESV(CUBED): 77.6 ML
BH CV ECHO MEAS - ESV(MOD-SP4): 57.6 ML
BH CV ECHO MEAS - FS: 31.6 %
BH CV ECHO MEAS - IVS/LVPW: 0.9 CM
BH CV ECHO MEAS - IVSD: 0.93 CM
BH CV ECHO MEAS - LA A2CS (ATRIAL LENGTH): 4.8 CM
BH CV ECHO MEAS - LV DIASTOLIC VOL/BSA (35-75): 59.5 CM2
BH CV ECHO MEAS - LV MASS(C)D: 257.6 GRAMS
BH CV ECHO MEAS - LV MAX PG: 3.8 MMHG
BH CV ECHO MEAS - LV MEAN PG: 2.2 MMHG
BH CV ECHO MEAS - LV SYSTOLIC VOL/BSA (12-30): 25.6 CM2
BH CV ECHO MEAS - LV V1 MAX: 98 CM/SEC
BH CV ECHO MEAS - LV V1 VTI: 25.5 CM
BH CV ECHO MEAS - LVIDD: 6.2 CM
BH CV ECHO MEAS - LVIDS: 4.3 CM
BH CV ECHO MEAS - LVOT AREA: 3.9 CM2
BH CV ECHO MEAS - LVOT DIAM: 2.21 CM
BH CV ECHO MEAS - LVPWD: 1.04 CM
BH CV ECHO MEAS - MR MAX PG: 45.2 MMHG
BH CV ECHO MEAS - MR MAX VEL: 336.1 CM/SEC
BH CV ECHO MEAS - MV A MAX VEL: 76.7 CM/SEC
BH CV ECHO MEAS - MV DEC SLOPE: 536.1 CM/SEC2
BH CV ECHO MEAS - MV DEC TIME: 0.2 MSEC
BH CV ECHO MEAS - MV E MAX VEL: 106 CM/SEC
BH CV ECHO MEAS - MV E/A: 1.38
BH CV ECHO MEAS - MV MAX PG: 6 MMHG
BH CV ECHO MEAS - MV MEAN PG: 2.25 MMHG
BH CV ECHO MEAS - MV V2 VTI: 34.5 CM
BH CV ECHO MEAS - MVA(VTI): 2.8 CM2
BH CV ECHO MEAS - PA V2 MAX: 97.4 CM/SEC
BH CV ECHO MEAS - QP/QS: 0.46
BH CV ECHO MEAS - RAP SYSTOLE: 3 MMHG
BH CV ECHO MEAS - RV MAX PG: 1.54 MMHG
BH CV ECHO MEAS - RV V1 MAX: 62.1 CM/SEC
BH CV ECHO MEAS - RV V1 VTI: 14 CM
BH CV ECHO MEAS - RVDD: 3.8 CM
BH CV ECHO MEAS - RVOT DIAM: 2.02 CM
BH CV ECHO MEAS - RVSP: 26.7 MMHG
BH CV ECHO MEAS - SI(MOD-SP4): 33.9 ML/M2
BH CV ECHO MEAS - SV(LVOT): 98.3 ML
BH CV ECHO MEAS - SV(MOD-SP4): 76.3 ML
BH CV ECHO MEAS - SV(RVOT): 44.8 ML
BH CV ECHO MEAS - TR MAX PG: 23.7 MMHG
BH CV ECHO MEAS - TR MAX VEL: 243.1 CM/SEC
MAXIMAL PREDICTED HEART RATE: 162 BPM
STRESS TARGET HR: 138 BPM

## 2022-09-27 ENCOUNTER — TELEPHONE (OUTPATIENT)
Dept: CARDIOLOGY | Facility: CLINIC | Age: 57
End: 2022-09-27

## 2022-09-27 NOTE — TELEPHONE ENCOUNTER
DR. CALDERON'S OFFICE CALLED TO STATE THEY WILL BE FAXING A CLEARANCE OVER FOR A PROCEDURE ON 11/29/22.      PLEASE CALL [phone] WITH ANY QUESTIONS.

## 2023-03-16 ENCOUNTER — TELEPHONE (OUTPATIENT)
Dept: CARDIOLOGY | Facility: CLINIC | Age: 58
End: 2023-03-16

## 2023-03-16 NOTE — TELEPHONE ENCOUNTER
Caller: Uday Jay    Relationship: Self    Best call back number: [phone]    What is the best time to reach you: ANY     Who are you requesting to speak with (clinical staff, provider,  specific staff member): ANY       What was the call regarding: PT NEEDS OFFICE TO RE[facility].     Do you require a callback: NO

## 2023-03-29 ENCOUNTER — OFFICE VISIT (OUTPATIENT)
Dept: CARDIOLOGY | Facility: CLINIC | Age: 58
End: 2023-03-29
Payer: COMMERCIAL

## 2023-03-29 VITALS
HEIGHT: 70 IN | HEART RATE: 80 BPM | DIASTOLIC BLOOD PRESSURE: 80 MMHG | SYSTOLIC BLOOD PRESSURE: 120 MMHG | WEIGHT: 245 LBS | OXYGEN SATURATION: 98 % | BODY MASS INDEX: 35.07 KG/M2

## 2023-03-29 DIAGNOSIS — I10 PRIMARY HYPERTENSION: ICD-10-CM

## 2023-03-29 DIAGNOSIS — Z95.1 S/P CABG (CORONARY ARTERY BYPASS GRAFT): ICD-10-CM

## 2023-03-29 DIAGNOSIS — I25.119 CORONARY ARTERY DISEASE INVOLVING NATIVE CORONARY ARTERY OF NATIVE HEART WITH ANGINA PECTORIS: Primary | ICD-10-CM

## 2023-03-29 DIAGNOSIS — E78.2 MIXED HYPERLIPIDEMIA: ICD-10-CM

## 2023-03-29 PROCEDURE — 93000 ELECTROCARDIOGRAM COMPLETE: CPT | Performed by: INTERNAL MEDICINE

## 2023-03-29 PROCEDURE — 99214 OFFICE O/P EST MOD 30 MIN: CPT | Performed by: INTERNAL MEDICINE

## 2023-03-29 RX ORDER — SEMAGLUTIDE 1.34 MG/ML
INJECTION, SOLUTION SUBCUTANEOUS
COMMUNITY
Start: 2023-03-14

## 2023-03-29 NOTE — PROGRESS NOTES
Cardiology Office Visit      Encounter Date:  03/29/2023    Patient ID:   Uday Jay is a 58 y.o. male.    Reason For Followup:  Coronary artery disease    Brief Clinical History:  Dear Jeana Robert APRN    I had the pleasure of seeing Uday Jay today. As you are well aware, this is a 58 y.o. male with a history of ischemic heart disease.  He is undergone four-vessel coronary arterial bypass grafting in November 2004.  He has additional history that includes hypertension, hypertensive cardiovascular disease, hyperlipidemia, diabetes mellitus, obstructive sleep apnea, chronic back pain, palpitations, and antiplatelet therapy.  He presents today for follow-up on the above conditions.    Interval History:  He is reporting some more frequent chest discomfort.  In fact he reports that he did have a cardiac catheterization a couple of weeks ago (March 2023).  He states that the cardiologist told him that he had about a 50% in-stent restenosis of the stent we put in in 2021.  The vessel was very small caliber with a significant vein to native vessel mismatch.  He did receive relief after having that placed.  [facility] that [facility] management was the best course of therapy.    I was able to acquire his images, labs, and cath report [facility].  We sat down and reviewed both his films from last year as well as the [facility] films [id number].  I would agree that there is a degree of in-stent restenosis however it is probably closer to 30%.  More importantly, the lumen of the stent with the 30% in-stent stenosis is equal to the caliber of the lumen of the native circulation.  It is unlikely that any significant flow limitation is present.  I agree with the assessment that medical therapy currently is the best course of action locally.    More importantly, the patient has severe multivessel disease with very small caliber coronaries.  We discussed all  options.  The patient inquired as to whether redo CABG would be appropriate.  He does not have adequate targets to accomplish this.  We discussed EECP and we are actively trying to get our service back online.  We also discussed a referral to a tertiary care center.  There may be some clinical trials that are underway for chronic angina.  He may be a candidate for advanced percutaneous revascularization techniques that are more readily available at those facilities as well.    As you will recall, he underwent cardiac catheterization in August 2021.  He had occlusion that was known of his saphenous vein graft to his circumflex vessel.  In contrast to his 2020 catheterization, his vein graft to diagonal demonstrated severe/subtotal disease.  This was treated with a small drug-eluting stent.  Since this was done, the patient reports about an 80% improvement in his symptoms.  The remainder of his catheterization findings were similar to the 2020 films.     His previous catheterization from October 2020 demonstrated quite disappointing findings. He has severe native vessel coronary artery disease with complete occlusion of his LAD, circumflex, and RCA origins.  He has undergone four-vessel CABG in the past.  The vein graft to the circumflex has suffered attrition but the graft to the diagonal, RCA, and LAD remain.  The difficulty lies in the caliber of his native vasculature.  The arteries distal to the anastomoses are diffusely diseased and very small caliber.  His LAD has a total occlusion just beyond the anastomosis with reconstitution of the vessel via left to left collaterals.  The distal RCA and diagonal branches suffer from similar small caliber.    Assessment & Plan    Impressions:  Coronary artery disease status post four-vessel CABG November 2004     Attrition of saphenous vein graft to circumflex system noted catheterization October 2020     Severe native vessel small caliber coronary arterial disease noted  "catheterization October 2020     Status post PCI JIMENA SVG-OM 2022 with 40% in-stent restenosis noted on catheterization March 2023 at [facility].  Hypertension  Hypertensive cardiovascular disease  Hyperlipidemia  Diabetes mellitus  Palpitations  Obstructive sleep apnea  Chronic pain  Antiplatelet therapy    Recommendations:  Continuation of his current cardiovascular regimen at the present time.     This includes antihypertensives, lipid-lowering strategies, and antiplatelet therapy.  Limited options for this particular patient.  He has extremely small coronary vasculature that is heavily diseased.  Films have been reviewed and there are no lesions that I would be comfortable trying to revascularize locally.  I discussed EECP with the patient and once our clinic is back online, we will get him enrolled.  I additionally discussed referral to [facility] to see if he may be a candidate for advanced percutaneous/surgical/hybrid techniques that are not readily available in our local facility or to see if he would be a candidate for any clinical trial that may be beneficial to him from an angina standpoint.  Follow-up in 3 months time sooner should there be difficulties.    Diagnoses and all orders for this visit:    1. Coronary artery disease involving native coronary artery of native heart with angina pectoris (HCC) (Primary)  -     ECG 12 Lead    2. Mixed hyperlipidemia  -     ECG 12 Lead    3. Primary hypertension  -     ECG 12 Lead    4. S/P CABG (coronary artery bypass graft)  -     ECG 12 Lead          Objective:    Vitals:  Vitals:    03/29/23 1313   BP: 120/80   BP Location: Left arm   Patient Position: Sitting   Cuff Size: Adult   Pulse: 80   SpO2: 98%   Weight: 111 kg (245 lb)   Height: 177.8 cm (70\")     Body mass index is 35.15 kg/m².      Physical Exam:    General: Alert, cooperative, no distress, appears stated age  Head:  Normocephalic, atraumatic, mucous membranes moist  Eyes:  Conjunctiva/corneas " clear, EOM's intact     Neck:  Supple,  no bruit    Lungs: Clear to auscultation bilaterally, no wheezes rhonchi rales are noted  Chest wall: No tenderness  Heart::  Regular rate and rhythm, S1 and S2 normal, 1/6 holosystolic murmur.  No rub or gallop  Abdomen: Soft, non-tender, nondistended bowel sounds active.  Obese  Extremities: No cyanosis, clubbing, or edema  Pulses: 2+ and symmetric all extremities  Skin:  No rashes or lesions  Neuro/psych: A&O x3. CN II through XII are grossly intact with appropriate affect      Allergies:  No Known Allergies    Medication Review:     Current Outpatient Medications:   •  Alirocumab (Praluent) 75 MG/ML solution auto-injector, Inject  under the skin into the appropriate area as directed., Disp: , Rfl:   •  Alogliptin Benzoate 25 MG tablet, Take 1 tablet by mouth Daily., Disp: , Rfl:   •  aspirin  MG tablet, Take 1 tablet by mouth Daily., Disp: , Rfl:   •  atorvastatin (LIPITOR) 80 MG tablet, Take 1 tablet by mouth Daily., Disp: , Rfl:   •  carvedilol (COREG) 25 MG tablet, Take 1 tablet by mouth 2 (Two) Times a Day With Meals., Disp: , Rfl: 3  •  clopidogrel (PLAVIX) 75 MG tablet, Take 1 tablet by mouth Daily., Disp: , Rfl:   •  dilTIAZem (CARDIZEM) 30 MG tablet, TAKE 1 TABLET BY MOUTH THREE TIMES DAILY FOR ANGINA, Disp: , Rfl:   •  escitalopram (LEXAPRO) 10 MG tablet, Take 1 tablet by mouth Daily., Disp: , Rfl: 2  •  ezetimibe (ZETIA) 10 MG tablet, Take 1 tablet by mouth Daily., Disp: , Rfl:   •  insulin glargine (LANTUS) 100 UNIT/ML injection, Inject 55 Units under the skin into the appropriate area as directed Every Night. Took 25 units 8/12/2021., Disp: , Rfl:   •  isosorbide mononitrate (IMDUR) 120 MG 24 hr tablet, Take 1 tablet by mouth 2 (Two) Times a Day., Disp: , Rfl:   •  Jardiance 25 MG tablet tablet, , Disp: , Rfl:   •  metFORMIN (GLUCOPHAGE) 1000 MG tablet, Take 1 tablet by mouth Daily With Breakfast., Disp: , Rfl: 1  •  Ozempic, 1 MG/DOSE, 4 MG/3ML solution  "pen-injector, , Disp: , Rfl:   •  pantoprazole (PROTONIX) 40 MG EC tablet, Take 1 tablet by mouth Daily., Disp: , Rfl:   •  ranolazine (RANEXA) 1000 MG 12 hr tablet, Take 1 tablet by mouth 2 (Two) Times a Day., Disp: , Rfl:   •  BD Insulin Syringe U/F 31G X 5/16\" 1 ML misc, , Disp: , Rfl:   •  nitroglycerin (NITROSTAT) 0.4 MG SL tablet, Place 0.4 mg under the tongue Every 5 (Five) Minutes As Needed. do not exceed a total of 3 doses in 15 minutes, Disp: , Rfl:     Family History:  Family History   Problem Relation Age of Onset   • Heart disease Mother    • Cancer Father        Past Medical History:  Past Medical History:   Diagnosis Date   • Abnormal ECG    • Clinical diagnosis of severe acute respiratory syndrome coronavirus 2 (SARS-CoV-2) disease 01/24/2022   • Congenital heart disease    • Coronary artery disease    • Diabetes (HCC)    • GERD (gastroesophageal reflux disease)    • Hyperlipidemia    • Hypertension    • Low back pain    • Myocardial infarction (HCC)    • Sleep apnea        Past Surgical History:  Past Surgical History:   Procedure Laterality Date   • CARDIAC CATHETERIZATION     • CARDIAC CATHETERIZATION Left 10/23/2020    Procedure: Cardiac Catheterization/Vascular Study;  Surgeon: Adolfo Vogel DO;  Location: [facility] CATH INVASIVE LOCATION;  Service: Cardiology;  Laterality: Left;   • CARDIAC CATHETERIZATION N/A 08/13/2021    Procedure: LEFT HEART CATH with possible PCI;  Surgeon: Adolfo Vogel DO;  Location: [facility] CATH INVASIVE LOCATION;  Service: Cardiology;  Laterality: N/A;  Local and IV sedation   • CARDIAC CATHETERIZATION  08/13/2021    Procedure: Saphenous Vein Graft;  Surgeon: Adolfo Vogel DO;  Location: [facility] CATH INVASIVE LOCATION;  Service: Cardiology;;   SVG X 3   LIMA   • CARDIAC CATHETERIZATION N/A 08/13/2021    Procedure: Percutaneous Coronary Intervention;  Surgeon: Adolfo Vogel DO;  Location: [facility] CATH INVASIVE " LOCATION;  Service: Cardiology;  Laterality: N/A;   SVG DIAG   • CARDIAC CATHETERIZATION N/A 2021    Procedure: Stent JIMENA bypass graft;  Surgeon: Adolfo Vogel DO;  Location: [facility] CATH INVASIVE LOCATION;  Service: Cardiology;  Laterality: N/A;   SVG DIAG ANASTOMOSIS SITE   • CARDIAC SURGERY     • CAROTID STENT     • CORONARY ARTERY BYPASS GRAFT     • CORONARY STENT PLACEMENT     • KNEE ARTHROPLASTY, PARTIAL REPLACEMENT     • PENILE PROSTHESIS IMPLANT     • SPINAL CORD STIMULATOR IMPLANT     • SPINAL CORD STIMULATOR IMPLANT N/A 2019    Procedure: T10 laminectomy and placement of spinal cord stimulator;  Surgeon: Gianluca Miles MD;  Location: [facility] MAIN OR;  Service: Neurosurgery       Social History:  Social History     Socioeconomic History   • Marital status:    Tobacco Use   • Smoking status: Former     Types: Cigarettes     Quit date: 2014     Years since quittin.2   • Smokeless tobacco: Never   Vaping Use   • Vaping Use: Never used   Substance and Sexual Activity   • Alcohol use: Yes     Comment: occ   • Drug use: No   • Sexual activity: Defer       Review of Systems:  The following systems were reviewed as they relate to the cardiovascular system: Constitutional, Eyes, ENT, Cardiovascular, Respiratory, Gastrointestinal, Integumentary, Neurological, Psychiatric, Hematologic, Endocrine, Musculoskeletal, and Genitourinary. The pertinent cardiovascular findings are reported above with all other cardiovascular points within those systems being negative.    Diagnostic Study Review:     Current Electrocardiogram:    ECG 12 Lead    Date/Time: 3/29/2023 8:15 PM  Performed by: Adolfo Vogel DO  Authorized by: Adolfo Vogel DO   Comparison: not compared with previous ECG   Previous ECG: no previous ECG available  Comments: Normal sinus rhythm with a ventricular rate of 72 bpm.  Right bundle branch block.  Normal QT and prolonged QTc  intervals of 434 and 476 ms respectively.  Normal QRS axis.            Laboratory Data:  Lab Results   Component Value Date    GLUCOSE 70 08/14/2021    BUN 12 08/14/2021    CREATININE 0.97 08/14/2021    EGFRIFNONA 80 08/14/2021    BCR 12.4 08/14/2021    K 3.9 08/14/2021    CO2 29.0 08/14/2021    CALCIUM 8.7 08/14/2021    ALBUMIN 4.70 10/30/2019    AST 32 10/30/2019    ALT 28 10/30/2019     Lab Results   Component Value Date    GLUCOSE 70 08/14/2021    CALCIUM 8.7 08/14/2021     08/14/2021    K 3.9 08/14/2021    CO2 29.0 08/14/2021     08/14/2021    BUN 12 08/14/2021    CREATININE 0.97 08/14/2021    EGFRIFNONA 80 08/14/2021    BCR 12.4 08/14/2021    ANIONGAP 10.0 08/14/2021     Lab Results   Component Value Date    WBC 4.90 08/14/2021    HGB 12.2 (L) 08/14/2021    HCT 36.0 (L) 08/14/2021    MCV 87.9 08/14/2021     08/14/2021     Lab Results   Component Value Date    CHOL 103 08/14/2021    TRIG 133 08/14/2021    HDL 34 (L) 08/14/2021    LDL 45 08/14/2021     Lab Results   Component Value Date    HGBA1C 6.5 (H) 08/14/2021     Lab Results   Component Value Date    INR 0.99 08/13/2021    INR 0.95 10/21/2020    INR 0.94 10/30/2019    PROTIME 11.0 08/13/2021    PROTIME 10.5 10/21/2020    PROTIME 10.0 10/30/2019       Most Recent Echo:  Results for orders placed during the hospital encounter of 09/17/22    Adult Transthoracic Echo Complete W/ Cont if Necessary Per Protocol    Interpretation Summary  · Estimated left ventricular EF was in disagreement with the calculated left ventricular EF. Left ventricular ejection fraction appears to be 46 - 50%. Left ventricular systolic function is mildly decreased.  · The following left ventricular wall segments are hypokinetic: apical anterior, apical lateral, apical inferior, apical septal and apex hypokinetic.  · The left ventricular cavity is severely dilated.  · Moderate tricuspid valve regurgitation is present.  · Estimated right ventricular systolic pressure  from tricuspid regurgitation is normal (<35 mmHg).       Most Recent Stress Test:  Results for orders placed during the hospital encounter of 10/05/20    Nuclear Medicine Cardiac Blood Pool Gated Spect At Rest    Interpretation Summary  · Left ventricular ejection fraction at rest was 56% with normal wall motion.       Most Recent Cardiac Catheterization:   Results for orders placed during the hospital encounter of 08/13/21    Cardiac Catheterization/Vascular Study    Narrative  Cardiac Catheterization with PCI Report    Uday Jay  [id number]  8/13/2021  @PCP@    He underwent cardiac catheterization with selective coronary angiography, left ventriculography, saphenous vein graft angiography, LIMA angiography, PCI JIMENA SVG-DG, post intervention angiography x2.    Indications for the procedure include: exertional angina.    Procedure Details:  The risks, benefits, complications, treatment options, and expected outcomes were discussed with the patient. The patient and/or family concurred with the proposed plan, giving informed consent.    After informed consent the patient was brought to the cath lab after appropriate IV hydration was begun and oral premedication was given. He was further sedated with fentanyl and midazolam. He was prepped and draped in the usual manner. Using the modified Seldinger access technique, a 6 Liberian sheath was placed in the femoral artery. A left heart catheterization with coronary arteriography was performed. Findings are discussed below.    The decision was made to proceed with intervention. He received Heparin as per protocol. The details of the intervention are as follows:    A 6 Liberian LCB guiding catheter was placed into the ostium of the saphenous vein graft to the diagonal branch.  A 0.014 x 180 Aktana PTCA wire was advanced into the distal diagonal branch.  A 2.0 x 8 trek semicompliant balloon was used to predilate the lesion.  The lesion was then treated with a 2.0  x 12 [place] drug-eluting stent.  Post intervention angiography x2 demonstrated an excellent result with JONATHAN-3 flow and no evidence of thrombus or dissection.    After the procedure was completed, sedation was stopped and the sheaths and catheters were all removed according to established hospital protocols.    Conscious sedation:  Conscious sedation was performed according to protocol.  I supervised and directed an independent trained observer with the assistance of monitoring the patient's level of consciousness and physiologic status throughout the procedure.  Intraoperative service time was 57 minutes.    Findings:    Hemodynamics  LVEDP: 9  LV: 100/0  Ao: 95/59  Left Main  99% distal  RCA  100% mid  Faint right to right collaterals  LAD  100% proximal  Circ  100% proximal  Left to left and right to left collaterals  SVG(s)  SVG-RCA: Patent  SVG-D% at anastomosis  CURLY  LIMA-LAD: Patent  100% LAD distal to the anastomotic site with left to left collaterals  LV  inferior wall hypokinesis overall left ventricular ejection fraction estimated at 45%  Coronary dominance  RCA    Interventions/Vessels  PCI JIMENA SVG-DG  Guides/Wires  6 [place]ib[place]ian LCB guide  DEXMA  Devices  2.0 x 8 trek  2.0 x 12 resolute [place]  Post % Stenosis  0  Pre-procedure JONATHAN flow  3  Post procedure JONATHAN flow  3  Closure Device  not applicable  Complications  none immediate    Estimated Blood Loss:  Minimal    Specimens: None    Complications:  None; patient tolerated the procedure well.    Disposition: PACU - hemodynamically stable    Condition: stable    Impressions:  Severe native vessel coronary arterial disease status post CABG  Attrition of saphenous vein graft to circumflex-known  Patent SVG-RCA  Patent LIMA-LAD  Successful PCI JIMENA SVG-DG with a 2.0 x 12 resolute Robbi    Recommendations:  Dual antiplatelet therapy indefinitely  Risk factor modification  EECP as outpatient       NOTE: The following portions of the patient's note were  reviewed, confirmed and/or updated this visit as appropriate: History of present illness/Interval history, physical examination, assessment & plan, allergies, current medications, past family history, past medical history, past social history, past surgical history and problem list.

## 2023-12-15 ENCOUNTER — TELEPHONE (OUTPATIENT)
Dept: CARDIOLOGY | Facility: CLINIC | Age: 58
End: 2023-12-15

## 2023-12-15 NOTE — TELEPHONE ENCOUNTER
Caller: MILES    Relationship: SELF    Best call back number: [phone]    What is the best time to reach you: ANY      What was the call regarding:  PT WAS WONDERING IF THE OFFICE STILL HAD A COPY OF [facility] CT SCAN [facility].  HE NEEDS THIS FOR HIS CONSULTATION WITH THE [facility]

## 2024-04-30 PROCEDURE — 87070 CULTURE OTHR SPECIMN AEROBIC: CPT | Performed by: NURSE PRACTITIONER

## 2024-04-30 PROCEDURE — 87205 SMEAR GRAM STAIN: CPT | Performed by: NURSE PRACTITIONER

## 2024-05-01 ENCOUNTER — PATIENT ROUNDING (BHMG ONLY) (OUTPATIENT)
Dept: URGENT CARE | Facility: CLINIC | Age: 59
End: 2024-05-01
Payer: OTHER GOVERNMENT

## 2024-05-01 NOTE — ED NOTES
Thank you for letting us care for you in your recent visit to our urgent care center. We would love to hear about your experience with us. Was this the first time you have visited our location?    We’re always looking for ways to make our patients’ experiences even better. Do you have any recommendations on ways we may improve?     I appreciate you taking the time to respond. Please be on the lookout for a survey about your recent visit from [employer] via text or email. We would greatly appreciate if you could fill that out and turn it back in. We want your voice to be heard and we value your feedback.   Thank you for choosing [facility] for your healthcare needs.

## (undated) DEVICE — SUT SILK 2/0 FS BLK 18IN 685G

## (undated) DEVICE — DRSNG WND BORDR/ADHS NONADHR/GZ LF 4X4IN STRL

## (undated) DEVICE — CATH DIAG IMPULSE FR4 6F 100CM

## (undated) DEVICE — CATH DIAG IMPULSE FL4 6F 100CM

## (undated) DEVICE — RADIFOCUS OBTURATOR: Brand: RADIFOCUS

## (undated) DEVICE — GLV SURG SENSICARE SLT PF LF 8 STRL

## (undated) DEVICE — PINNACLE INTRODUCER SHEATH: Brand: PINNACLE

## (undated) DEVICE — SUT MNCRYL 3/0 Y936H

## (undated) DEVICE — CATH DIAG IMPULSE PIG .056 6F 110CM

## (undated) DEVICE — CATH DIAG IMPULSE LCB 6F 100CM

## (undated) DEVICE — PK TRY HEART CATH 50

## (undated) DEVICE — ELECTRD DEFIB M/FUNC PROPADZ RADIOL 2PK

## (undated) DEVICE — Device: Brand: PROWATERFLEX

## (undated) DEVICE — GLV SURG TRIUMPH GREEN W/ALOE PF LTX 8.5 STRL

## (undated) DEVICE — GAUZE,SPONGE,4"X4",16PLY,XRAY,STRL,LF: Brand: MEDLINE

## (undated) DEVICE — CONTRST ISOVUE300 61PCT 50ML

## (undated) DEVICE — PROXIMATE RH ROTATING HEAD SKIN STAPLERS (35 WIDE) CONTAINS 35 STAINLESS STEEL STAPLES: Brand: PROXIMATE

## (undated) DEVICE — MINI TREK CORONARY DILATATION CATHETER 2.0 MM X 8 MM / RAPID-EXCHANGE: Brand: MINI TREK

## (undated) DEVICE — 3M™ IOBAN™ 2 ANTIMICROBIAL INCISE DRAPE 6650EZ: Brand: IOBAN™ 2

## (undated) DEVICE — SUT VIC 3/0 SH CR8 18IN J864D

## (undated) DEVICE — SUT VIC 0 CT2 CR8 18IN DYED J727D

## (undated) DEVICE — SKIN PREP TRAY W/CHG: Brand: MEDLINE INDUSTRIES, INC.

## (undated) DEVICE — STPCK 3WY HP ROT

## (undated) DEVICE — GW DIAG EMERALD HEPCOAT MOVE JTIP STD .035 3MM 150CM

## (undated) DEVICE — PK PROC TURNOVER

## (undated) DEVICE — TBG NAMIC PRESS MONTR A/ F/M 12IN

## (undated) DEVICE — DEV INFL COMPAK W/ACCESSPLUS IN4530

## (undated) DEVICE — GUIDE CATHETER: Brand: MACH1™

## (undated) DEVICE — GW PTFE EMERALD HEPCOAT FC J TIP STD .035 3MM 150CM

## (undated) DEVICE — PK BASIC SPINE 50

## (undated) DEVICE — SOL ISO/ALC RUB 70PCT 4OZ

## (undated) DEVICE — BOWL PLSTC MD 16OZ BLU STRL